# Patient Record
Sex: FEMALE | Race: WHITE | Employment: OTHER | URBAN - METROPOLITAN AREA
[De-identification: names, ages, dates, MRNs, and addresses within clinical notes are randomized per-mention and may not be internally consistent; named-entity substitution may affect disease eponyms.]

---

## 2017-06-06 PROCEDURE — 88175 CYTOPATH C/V AUTO FLUID REDO: CPT | Performed by: OBSTETRICS & GYNECOLOGY

## 2018-10-01 ENCOUNTER — TELEPHONE (OUTPATIENT)
Dept: NEUROLOGY | Facility: CLINIC | Age: 53
End: 2018-10-01

## 2018-10-01 NOTE — H&P
[id number] [facility] with [facility]  10/1/2018    9:51 AM      Cc: Headaches    HPI:    Feb 2016, she fell on ice in driveway falling backwards.   She lost consciousness for approximately 20 minutes (accompanyi Take 300 mg by mouth every evening., Disp: , Rfl:   •  Triamterene-HCTZ 37.5-25 MG Oral Tab, Take 1 tablet by mouth daily. , Disp: , Rfl:   •  ondansetron 4 MG Oral Tablet Dispersible, Take 4 mg by mouth every 8 (eight) hours as needed for Nausea., Disp: , •  MAGNESIUM OR, Take 500 mg by mouth every 30 (thirty) days. , Disp: , Rfl:   •  aspirin 81 MG Oral Tab, Take 81 mg by mouth daily. , Disp: , Rfl:   •  TURMERIC OR, Take by mouth., Disp: , Rfl:   •  Multiple Vitamins-Minerals (MULTIVITAMIN OR), Take by - possibly complicated migraine vs seizures    Dizziness - central plus peripheral component        Dx:  EEG, Neuropsychological evaluation    Tx:    Change Gabapentin to Topiramate 50 mg HS  Stop Indomethacin  Relpax with augmentation strategy (600 mg ibu

## 2018-10-02 ENCOUNTER — TELEPHONE (OUTPATIENT)
Dept: NEUROLOGY | Facility: CLINIC | Age: 53
End: 2018-10-02

## 2018-10-02 DIAGNOSIS — G43.701 CHRONIC MIGRAINE WITHOUT AURA WITH STATUS MIGRAINOSUS, NOT INTRACTABLE: Primary | ICD-10-CM

## 2018-10-02 RX ORDER — DIVALPROEX SODIUM 500 MG/1
500 TABLET, EXTENDED RELEASE ORAL DAILY
Qty: 30 TABLET | Refills: 5 | Status: SHIPPED | OUTPATIENT
Start: 2018-10-02 | End: 2019-01-25

## 2018-10-02 NOTE — TELEPHONE ENCOUNTER
Go back on Gabapentin for now  Definitely sounds like allergy to Topiramate    Start Depakote 500 mg ER at HS

## 2018-10-02 NOTE — TELEPHONE ENCOUNTER
pt is having medication reaction with the Topamax and spouse wants to know if Dr can precribe something else

## 2018-10-02 NOTE — TELEPHONE ENCOUNTER
Jazzy Gudino started medications from Dr. Karrie Rice: she took eletriptan yesterday afternoon, and topiramate at 11 PM. At about 3 AM she broke out into a rash and hives, head to toe, feels like she is sunburned.  Also reports nausea and feeling of throat tightness

## 2018-10-02 NOTE — TELEPHONE ENCOUNTER
Patient informed, all questions answered.  Topiramate added to allergy list and removed from medication list.

## 2018-10-05 ENCOUNTER — FAX ONLY (OUTPATIENT)
Dept: NEUROLOGY | Facility: CLINIC | Age: 53
End: 2018-10-05

## 2018-10-05 ENCOUNTER — DOCUMENTATION ONLY (OUTPATIENT)
Dept: NEUROLOGY | Facility: CLINIC | Age: 53
End: 2018-10-05

## 2018-10-05 NOTE — PROGRESS NOTES
Fax Intcial Examination faxed to [facility] with Provider Endorsement to [phone].  Confirmation receipt

## 2018-10-10 ENCOUNTER — NURSE ONLY (OUTPATIENT)
Dept: ELECTROPHYSIOLOGY | Facility: HOSPITAL | Age: 53
End: 2018-10-10
Attending: Other
Payer: COMMERCIAL

## 2018-10-10 DIAGNOSIS — R41.89 COGNITIVE CHANGES: ICD-10-CM

## 2018-10-10 DIAGNOSIS — R42 DIZZINESS AND GIDDINESS: ICD-10-CM

## 2018-10-10 DIAGNOSIS — R29.90 EPISODE OF TRANSIENT NEUROLOGIC SYMPTOMS: ICD-10-CM

## 2018-10-10 DIAGNOSIS — M79.18 CERVICAL MYOFASCIAL PAIN SYNDROME: ICD-10-CM

## 2018-10-10 DIAGNOSIS — G43.701 CHRONIC MIGRAINE WITHOUT AURA WITH STATUS MIGRAINOSUS, NOT INTRACTABLE: ICD-10-CM

## 2018-10-10 DIAGNOSIS — F07.81 POST CONCUSSION SYNDROME: ICD-10-CM

## 2018-10-10 NOTE — PROCEDURES
[address] [facility] in [place]  in affiliation with [facility]  [address]  [address]  [phone]  Fax [phone]    Name: Arya Hart  12/31/1965  Date of Study: Disp:  Rfl:    Naltrexone-Bupropion HCl ER (CONTRAVE) 8-90 MG Oral Tablet 12 Hr Take by mouth. Disp:  Rfl:    Omega-3 1400 MG Oral Cap Take by mouth.  Disp:  Rfl:    NON FORMULARY RELIZEN Disp:  Rfl:    carboxymethylcellulose sod PF (REFRESH CELLUVISC) 1 % Activation procedures:  Not done    Abnormal activity:  There were no focal abnormalities seen, and there were no epileptiform discharges. There were no abnormal paroxysmal discharges. Impression:  Normal awake and sleep EEG.  If there remains a

## 2018-10-12 PROCEDURE — 87624 HPV HI-RISK TYP POOLED RSLT: CPT | Performed by: OBSTETRICS & GYNECOLOGY

## 2018-10-12 PROCEDURE — 88175 CYTOPATH C/V AUTO FLUID REDO: CPT | Performed by: OBSTETRICS & GYNECOLOGY

## 2018-10-15 NOTE — TELEPHONE ENCOUNTER
Medication: Gabapentin 600 mg    Date of last refill:Historical  [place]     Last office visit: 10/1/2018  Due back to clinic per last office note:  RTN in 4 months  Date next office visit scheduled:    Future Appointmen

## 2018-10-17 ENCOUNTER — TELEPHONE (OUTPATIENT)
Dept: NEUROLOGY | Facility: CLINIC | Age: 53
End: 2018-10-17

## 2018-10-17 RX ORDER — GABAPENTIN 600 MG/1
600 TABLET ORAL 2 TIMES DAILY
Qty: 60 TABLET | Refills: 5 | Status: SHIPPED | OUTPATIENT
Start: 2018-10-17 | End: 2019-04-04

## 2018-10-17 NOTE — TELEPHONE ENCOUNTER
Physical therapy initial evaluation received for provider signature and review. Patient will be receiving therapy for headache, cervicalgia related to unspecified injury of the head.     Patient will be seen 3 times per week for 6 weeks as outlined in pl

## 2018-10-18 ENCOUNTER — TELEPHONE (OUTPATIENT)
Dept: NEUROLOGY | Facility: CLINIC | Age: 53
End: 2018-10-18

## 2018-10-18 RX ORDER — ELETRIPTAN HYDROBROMIDE 40 MG/1
40 TABLET, FILM COATED ORAL AS NEEDED
Qty: 8 TABLET | Refills: 5 | Status: SHIPPED | OUTPATIENT
Start: 2018-10-18 | End: 2018-11-17

## 2018-10-18 RX ORDER — METHYLPREDNISOLONE 4 MG/1
TABLET ORAL
Qty: 1 PACKAGE | Refills: 0 | Status: SHIPPED | OUTPATIENT
Start: 2018-10-18 | End: 2018-11-28

## 2018-10-18 NOTE — TELEPHONE ENCOUNTER
Spoke with Luiza Hagen and we will call in Medrol Dosepak and told her to hang in there with the Depakote as its been barely a week that she is taking it.   I will for now renew Eletriptan and expect that within 1-2 weeks we should start seeing diminishing freque

## 2018-10-18 NOTE — TELEPHONE ENCOUNTER
Patient was requesting a refill on Eletriptan due to getting a lot of headaches. Patient used all of her tabs from an rx on 10/08/18 and still is having headaches.  Patient is also on divalproex as a preventative and wants to know what can she get for the h

## 2018-10-22 ENCOUNTER — TELEPHONE (OUTPATIENT)
Dept: NEUROLOGY | Facility: CLINIC | Age: 53
End: 2018-10-22

## 2018-11-08 ENCOUNTER — TELEPHONE (OUTPATIENT)
Dept: NEUROLOGY | Facility: CLINIC | Age: 53
End: 2018-11-08

## 2018-11-08 NOTE — TELEPHONE ENCOUNTER
Received PT progress notes for MD review and signature. Patient to continue PT 3 times a week for 6 weeks. Paperwork signed and returned via fax.

## 2018-11-28 ENCOUNTER — OFFICE VISIT (OUTPATIENT)
Dept: SURGERY | Facility: CLINIC | Age: 53
End: 2018-11-28
Payer: COMMERCIAL

## 2018-11-28 VITALS — BODY MASS INDEX: 27.64 KG/M2 | HEIGHT: 66.5 IN | WEIGHT: 174 LBS

## 2018-11-28 DIAGNOSIS — G57.82 COMPRESSION NEUROPATHY OF LEFT ILIOINGUINAL NERVE: ICD-10-CM

## 2018-11-28 DIAGNOSIS — G57.81: ICD-10-CM

## 2018-11-28 DIAGNOSIS — E65 ABDOMINAL PANNUS: Primary | ICD-10-CM

## 2018-11-28 PROCEDURE — 99243 OFF/OP CNSLTJ NEW/EST LOW 30: CPT | Performed by: SURGERY

## 2018-11-29 NOTE — CONSULTS
New Patient Consultation    Chief Complaint:  Patient presents with:  Consult: Kerrie Packer.  Dr. Ana Leonard Referring      History of Present Illness:   Lois Bautista is a 46year old female referred by Dr. Ana Leonard for evaluation of pannus and ilioingu Disp:  Rfl:    Probiotic Product (PROBIOTIC ACIDOPHILUS BEADS OR) Take by mouth. Disp:  Rfl:    RaNITidine HCl 300 MG Oral Cap Take 300 mg by mouth every evening. Disp:  Rfl:    Triamterene-HCTZ 37.5-25 MG Oral Tab Take 1 tablet by mouth daily.  Disp:  Rfl: mouth. Disp:  Rfl:    Pantoprazole Sodium 40 MG Oral Tab EC Take 40 mg by mouth every morning before breakfast. Disp:  Rfl:      No current facility-administered medications on file prior to visit.        Allergies:      Topiramate              ANAPHYLAXIS Systems:    A 13 point review of systems was performed on the intake sheet.   The patient reports see HPI, patient reports weight loss, double vision, asthma, reflux symptoms, constipation, frequent urination and nighttime urine, skin lesions easy bruising itching, skin lesions, dry skin, change in skin color or change in moles, sunburns, or sunburns with blistering.    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 Discussion and Plan:  The patient was counseled on the different treatment options.      Discussed with the patient the option of local anes

## 2018-11-30 NOTE — PROGRESS NOTES
The following message was sent to our billing department on behalf of Dr. Lily Bhatti:     \"Hi,     Dr. Lily Bhatti would like you to determine insurance approval and provide a quote as appropriate for:     Abdominoplasty, 3.5 hours, general anesthesia      Excision o

## 2018-12-05 ENCOUNTER — TELEPHONE (OUTPATIENT)
Dept: SURGERY | Facility: CLINIC | Age: 53
End: 2018-12-05

## 2018-12-07 ENCOUNTER — TELEPHONE (OUTPATIENT)
Dept: NEUROLOGY | Facility: CLINIC | Age: 53
End: 2018-12-07

## 2018-12-07 NOTE — TELEPHONE ENCOUNTER
Spoke with patient  Prophylactic treatment is limited  Aimovig Rx written.     Lucio Sims MD  Vascular & General Neurology  Director, Multiple Sclerosis Program  [facility]  12/7/2018, Time completed 11:25 AM

## 2018-12-07 NOTE — TELEPHONE ENCOUNTER
Pt reports that she has been experiencing side effects from the Depakote ER:  Continuous muscle pain in upper arms and thighs (not workout related), muscle twitching in arms and legs, hair breaking, constant hoarseness, excessive sleepiness during the day

## 2018-12-11 ENCOUNTER — TELEPHONE (OUTPATIENT)
Dept: NEUROLOGY | Facility: CLINIC | Age: 53
End: 2018-12-11

## 2018-12-24 NOTE — TELEPHONE ENCOUNTER
Pharmacy having trouble identifying patient. CMM rep will fax a form we can use that may help with this error.

## 2018-12-27 NOTE — TELEPHONE ENCOUNTER
Received denial from [employer] noting that patient needs to try/fail at least 2 different migraine prevention classes includin) antidepresant  2) anticonvulsant  3) beta blockers    Initial request noted that patient was unable to try/fail additional anti

## 2019-01-04 NOTE — TELEPHONE ENCOUNTER
L/M requesting a call back regarding her pre- d for surgery. Pre-D has been denied as not medically necessary. Patient should have received a denial letter from [facility].

## 2019-01-04 NOTE — TELEPHONE ENCOUNTER
S/W patient and informed her of the pre-d denial for codes [id number] and  per [facility] not medically necessary. Patient states that because of her ilioinguinal that this should be approved. Patient would like Dr. Ruddy Pollock to appeal this denial on her behave.   I

## 2019-01-07 ENCOUNTER — TELEPHONE (OUTPATIENT)
Dept: SURGERY | Facility: CLINIC | Age: 54
End: 2019-01-07

## 2019-01-07 NOTE — TELEPHONE ENCOUNTER
Received an email from Dr. Bk Swann regarding the peer to peer and or appeal request for the patient. Dr. Bk Swann stated that at this time patient does not meet all of BCBS criteria at this time.   Dr. Bk Swann recommends the patient work with her PCP to meet and

## 2019-01-07 NOTE — TELEPHONE ENCOUNTER
I called Deborah Memos on behalf of Dr. Ibarra Quiet and encouraged her to contact her PCP to have them document her tried and failed therapies regarding her desired surgical procedure-  She verbalized understanding and will contact her PCP to have them document the need

## 2019-01-09 NOTE — TELEPHONE ENCOUNTER
Pt stopped at office. Denied with medication and want to try 2 new medications and did not work well and had side effects. Divalproex sodium - reaction  Toprimade - reaction    Relayed below information and gave pt copy of appeal letter.

## 2019-01-10 NOTE — TELEPHONE ENCOUNTER
Fax received from [facility] requesting vestibular rehab and balance training to be added to order. Signed order faxed with receipt confirmation.

## 2019-01-11 ENCOUNTER — TELEPHONE (OUTPATIENT)
Dept: NEUROLOGY | Facility: CLINIC | Age: 54
End: 2019-01-11

## 2019-01-11 NOTE — TELEPHONE ENCOUNTER
PT progress notes received for MD review and signature. Patient to continue PT 2-3 times per week x 6 weeks.  With goals to improve (pain relief, decrease inflammation, increase blood flow, improve tissue healing), electrical stimulation (interferential,

## 2019-01-16 NOTE — TELEPHONE ENCOUNTER
Arjun Martinez notifying us the Aimovig appeal was denied. Office notes did not support other medications were tried and failed.

## 2019-01-25 NOTE — PROGRESS NOTES
[facility] with [address]  12/31/1965  Primary Care Provider:  Kathleen Robbins    1/25/2019  Accompanied visit:  (x) No ( ) yes, by:      48year old yo patient being seen f Oral Tab, Take 1 tablet by mouth daily. , Disp: , Rfl:   •  ondansetron 4 MG Oral Tablet Dispersible, Take 4 mg by mouth every 8 (eight) hours as needed for Nausea., Disp: , Rfl:   •  albuterol sulfate (2.5 MG/3ML) 0.083% Inhalation Nebu Soln, Take by nebul ANAPHYLAXIS  Propoxyphene N-Apap     NAUSEA AND VOMITING  Sodium Pentobarbita*    UNKNOWN  Sulfa Antibiotics       UNKNOWN  Valium [Diazepam]       UNKNOWN  Vicodin [Hydrocodon*    UNKNOWN  Byetta                  RASH  Influenza Vaccines      R 40 MG Oral Tab          Sig: Take 1 tablet (40 mg total) by mouth as needed. Dispense:  9 tablet          Refill:  5      Eletriptan Hydrobromide 40 MG Oral Tab          Sig: Take 1 tablet (40 mg total) by mouth as needed.           Dispense:  9 ta

## 2019-01-25 NOTE — PATIENT INSTRUCTIONS
Refill policies:    • Allow 2-3 business days for refills; controlled substances may take longer.   • Contact your pharmacy at least 5 days prior to running out of medication and have them send an electronic request or submit request through the “request re been approved by your insurer. Depending on your insurance carrier, approval may take 3-10 days. It is highly recommended patients contact their insurance carrier directly to determine coverage.   If test is done without insurance authorization, patient ma

## 2019-02-14 ENCOUNTER — TELEPHONE (OUTPATIENT)
Dept: NEUROLOGY | Facility: CLINIC | Age: 54
End: 2019-02-14

## 2019-02-14 NOTE — TELEPHONE ENCOUNTER
PT progress notes received from [facility] for physician review and signature. Patient receives PT for headache, cervicalgia, dissiness    Frequency: 2-3 times per week    Duration: 6 weeks    Plan of Care:  Therapeutic exercises, gait training moda

## 2019-03-04 ENCOUNTER — TELEPHONE (OUTPATIENT)
Dept: NEUROLOGY | Facility: CLINIC | Age: 54
End: 2019-03-04

## 2019-03-13 NOTE — TELEPHONE ENCOUNTER
Received authorization for Zomig nasal spray effective 3/1/19 - 3/1/20. Case ID [id number]. Notification forwarded to pharmacy.

## 2019-03-25 ENCOUNTER — TELEPHONE (OUTPATIENT)
Dept: NEUROLOGY | Facility: CLINIC | Age: 54
End: 2019-03-25

## 2019-04-04 RX ORDER — GABAPENTIN 600 MG/1
TABLET ORAL
Qty: 60 TABLET | Refills: 5 | Status: SHIPPED | OUTPATIENT
Start: 2019-04-04 | End: 2019-10-03

## 2019-04-04 NOTE — TELEPHONE ENCOUNTER
Medication: Gabapentin 600 mg    Date of last refill: 10/17/18 with 5 addt refills  [place]     Last office visit: 01/25/19  Due back to clinic per last office note:  RTN in 6 months  Date next office visit scheduled: ( ) Followup for special test  /or keep scheduled appointment  Patient understands that if needed, based on condition and or test results, follow up will be readjusted           Chris Vo MD  Vascular & General Neurology  Director, Multiple Sclerosis

## 2019-04-29 ENCOUNTER — TELEPHONE (OUTPATIENT)
Dept: NEUROLOGY | Facility: CLINIC | Age: 54
End: 2019-04-29

## 2019-04-29 ENCOUNTER — OFFICE VISIT (OUTPATIENT)
Dept: NEUROLOGY | Facility: CLINIC | Age: 54
End: 2019-04-29
Payer: COMMERCIAL

## 2019-04-29 VITALS
SYSTOLIC BLOOD PRESSURE: 118 MMHG | RESPIRATION RATE: 16 BRPM | DIASTOLIC BLOOD PRESSURE: 80 MMHG | HEIGHT: 66.5 IN | TEMPERATURE: 98 F | BODY MASS INDEX: 28.35 KG/M2 | WEIGHT: 178.5 LBS | HEART RATE: 74 BPM

## 2019-04-29 DIAGNOSIS — G43.701 CHRONIC MIGRAINE WITHOUT AURA WITH STATUS MIGRAINOSUS, NOT INTRACTABLE: Primary | ICD-10-CM

## 2019-04-29 DIAGNOSIS — M79.18 CERVICAL MYOFASCIAL PAIN SYNDROME: ICD-10-CM

## 2019-04-29 DIAGNOSIS — M79.18 CERVICAL MYOFASCIAL PAIN SYNDROME: Primary | ICD-10-CM

## 2019-04-29 DIAGNOSIS — R29.90 EPISODE OF TRANSIENT NEUROLOGIC SYMPTOMS: ICD-10-CM

## 2019-04-29 PROCEDURE — 99213 OFFICE O/P EST LOW 20 MIN: CPT | Performed by: OTHER

## 2019-04-29 PROCEDURE — 20553 NJX 1/MLT TRIGGER POINTS 3/>: CPT | Performed by: OTHER

## 2019-04-29 RX ORDER — BUPIVACAINE HYDROCHLORIDE 2.5 MG/ML
4 INJECTION, SOLUTION EPIDURAL; INFILTRATION; INTRACAUDAL ONCE
Status: COMPLETED | OUTPATIENT
Start: 2019-04-29 | End: 2019-04-29

## 2019-04-29 RX ORDER — ZOLMITRIPTAN 5 MG/1
SPRAY NASAL
Qty: 5 EACH | Refills: 3 | Status: SHIPPED | OUTPATIENT
Start: 2019-04-29 | End: 2019-06-05

## 2019-04-29 RX ORDER — ELETRIPTAN HYDROBROMIDE 40 MG/1
40 TABLET, FILM COATED ORAL AS NEEDED
Qty: 9 TABLET | Refills: 5 | Status: SHIPPED | OUTPATIENT
Start: 2019-04-29 | End: 2020-01-23

## 2019-04-29 RX ORDER — TRIAMCINOLONE ACETONIDE 40 MG/ML
40 INJECTION, SUSPENSION INTRA-ARTICULAR; INTRAMUSCULAR ONCE
Status: COMPLETED | OUTPATIENT
Start: 2019-04-29 | End: 2019-04-29

## 2019-04-29 NOTE — TELEPHONE ENCOUNTER
Called [facility] and spoke with Shantel Gibbs codes 62247,78797 are valid and billable no authorization or predetermination required. Call reference #[id number].  Time on call 20:14

## 2019-04-29 NOTE — TELEPHONE ENCOUNTER
Plan of PT: 2 - 3 times a week for 6 weeks. Treatment to be provided therapeutic exercise, gait training, neuromuscular rehabilitation, manual therapy and patient education.      Patient problems: neck pain, poor balance, impaired gait, decreased tolerance

## 2019-04-29 NOTE — TELEPHONE ENCOUNTER
Trigger point injection was administered at office visit. Will request PA for procedure given today.

## 2019-04-29 NOTE — TELEPHONE ENCOUNTER
Dr. Zainab Cortez reviewed 4/26/19 P. T progress note and signed. Faxed back to [email or web address], fax receipt confirmed. Report placed in RN bin for documentation.

## 2019-04-29 NOTE — PROGRESS NOTES
[facility] with [address]  12/31/1965  Primary Care Provider:  Yael Heredia    4/29/2019  Accompanied visit:  (x) No ( ) yes, by:         48year old yo patient being see RaNITidine HCl 300 MG Oral Cap, Take 300 mg by mouth every evening., Disp: , Rfl:   •  Triamterene-HCTZ 37.5-25 MG Oral Tab, Take 1 tablet by mouth daily. , Disp: , Rfl:   •  ondansetron 4 MG Oral Tablet Dispersible, Take 4 mg by mouth every 8 (eight) hours Oral Tab EC, Take 40 mg by mouth every morning before breakfast., Disp: , Rfl:   PRN:     Allergies:    Topiramate              ANAPHYLAXIS  Valproic Acid           OTHER (SEE COMMENTS)    Comment:Tremors, horse voice, weight gain, hair loss  Propoxyphene on the case:  Continue PTX  Request BOTOX and or AIMOVIG/ AJOVY    Diagnostics & Orders:  Orders Placed This Encounter      Eletriptan Hydrobromide 40 MG Oral Tab          Sig: Take 1 tablet (40 mg total) by mouth as needed.           Dispense:  9 tablet

## 2019-05-14 ENCOUNTER — TELEPHONE (OUTPATIENT)
Dept: NEUROLOGY | Facility: CLINIC | Age: 54
End: 2019-05-14

## 2019-05-28 ENCOUNTER — OFFICE VISIT (OUTPATIENT)
Dept: NEUROLOGY | Facility: CLINIC | Age: 54
End: 2019-05-28
Payer: COMMERCIAL

## 2019-05-28 VITALS
SYSTOLIC BLOOD PRESSURE: 126 MMHG | WEIGHT: 178 LBS | BODY MASS INDEX: 28.27 KG/M2 | HEIGHT: 66.5 IN | DIASTOLIC BLOOD PRESSURE: 77 MMHG | HEART RATE: 79 BPM | RESPIRATION RATE: 16 BRPM

## 2019-05-28 DIAGNOSIS — G43.701 CHRONIC MIGRAINE WITHOUT AURA WITH STATUS MIGRAINOSUS, NOT INTRACTABLE: Primary | ICD-10-CM

## 2019-05-28 DIAGNOSIS — M79.18 CERVICAL MYOFASCIAL PAIN SYNDROME: ICD-10-CM

## 2019-05-28 PROCEDURE — 99213 OFFICE O/P EST LOW 20 MIN: CPT | Performed by: OTHER

## 2019-05-28 PROCEDURE — 20553 NJX 1/MLT TRIGGER POINTS 3/>: CPT | Performed by: OTHER

## 2019-05-28 RX ORDER — TRIAMCINOLONE ACETONIDE 40 MG/ML
40 INJECTION, SUSPENSION INTRA-ARTICULAR; INTRAMUSCULAR ONCE
Status: COMPLETED | OUTPATIENT
Start: 2019-05-28 | End: 2019-05-28

## 2019-05-28 RX ORDER — BUPIVACAINE HYDROCHLORIDE 2.5 MG/ML
8 INJECTION, SOLUTION EPIDURAL; INFILTRATION; INTRACAUDAL ONCE
Status: COMPLETED | OUTPATIENT
Start: 2019-05-28 | End: 2019-05-28

## 2019-05-28 NOTE — PROGRESS NOTES
[facility] with [address]  12/31/1965  Primary Care Provider:  Joseph Kauffman    5/28/2019  Accompanied visit:     () No.        48year old yo patient being seen fo Disp: , Rfl:   •  Albuterol Sulfate  (90 Base) MCG/ACT Inhalation Aero Soln, Inhale into the lungs every 6 (six) hours as needed for Wheezing., Disp: , Rfl:   •  cetirizine 10 MG Oral Tab, Take 10 mg by mouth daily. , Disp: , Rfl:   •  Glucose Blood UNKNOWN  Byetta                  RASH  Influenza Vaccines      RASH  Metformin               DIARRHEA         EXAM:  /77 (BP Location: Left arm, Patient Position: Sitting, Cuff Size: adult)   Pulse 79   Resp 16   Ht 66.5\"   Wt 178 lb   BMI 28.30 kg/ MA/PSRs during the entirety of the visit

## 2019-05-28 NOTE — PROCEDURES
Silvina  5/28/2019      Procedure:  Trigger Point Injections  NOTES:   MTP in the left suboccipital, upper trapezius and interscapular,  Less tender right suboccipital, upper trapezius and rhomboids    /77 (BP Location: Left ar

## 2019-05-31 ENCOUNTER — PATIENT MESSAGE (OUTPATIENT)
Dept: NEUROLOGY | Facility: CLINIC | Age: 54
End: 2019-05-31

## 2019-05-31 NOTE — TELEPHONE ENCOUNTER
From: Lois Bautista  To: Radha Montenegro MD  Sent: 5/31/2019 12:25 PM CDT  Subject: Prescription Question    Dr Karrie Rice had ordered me Zomig 5mg for migraines.  My insurance has approved 12 a month but  wrote it for 6 so they wont fill it till its a

## 2019-06-03 ENCOUNTER — TELEPHONE (OUTPATIENT)
Dept: NEUROLOGY | Facility: CLINIC | Age: 54
End: 2019-06-03

## 2019-06-03 NOTE — TELEPHONE ENCOUNTER
Received PT progress note for MD review and signature. Patient continues to receive PT 2-3 times a week for 6 weeks to include therapeutic exercises, gait training, neuromuscular rehabilitation, manual therapy and HEP / postural training education.     Andre Vilalsenor

## 2019-06-04 NOTE — TELEPHONE ENCOUNTER
Denial notes reviewed. Denial of appeal states: Your doctor's notes do not show that you have tried and failed the medicines (antidepressants such as venlafaxine, nortriptyline, and amitriptyline) covered by your plan.  Therefore, this medicine (Aimovig) Manual Repair Warning Statement: We plan on removing the manually selected variable below in favor of our much easier automatic structured text blocks found in the previous tab. We decided to do this to help make the flow better and give you the full power of structured data. Manual selection is never going to be ideal in our platform and I would encourage you to avoid using manual selection from this point on, especially since I will be sunsetting this feature. It is important that you do one of two things with the customized text below. First, you can save all of the text in a word file so you can have it for future reference. Second, transfer the text to the appropriate area in the Library tab. Lastly, if there is a flap or graft type which we do not have you need to let us know right away so I can add it in before the variable is hidden. No need to panic, we plan to give you roughly 6 months to make the change.

## 2019-06-05 ENCOUNTER — OFFICE VISIT (OUTPATIENT)
Dept: SURGERY | Facility: CLINIC | Age: 54
End: 2019-06-05
Payer: COMMERCIAL

## 2019-06-05 VITALS — HEIGHT: 66.5 IN | BODY MASS INDEX: 28.53 KG/M2 | WEIGHT: 179.63 LBS

## 2019-06-05 DIAGNOSIS — S31.109A OPEN WOUND OF ABDOMEN, INITIAL ENCOUNTER: ICD-10-CM

## 2019-06-05 DIAGNOSIS — E65 ABDOMINAL PANNUS: ICD-10-CM

## 2019-06-05 DIAGNOSIS — G57.82 COMPRESSION NEUROPATHY OF LEFT ILIOINGUINAL NERVE: ICD-10-CM

## 2019-06-05 DIAGNOSIS — L30.4 INTERTRIGO: ICD-10-CM

## 2019-06-05 DIAGNOSIS — G57.81: Primary | ICD-10-CM

## 2019-06-05 PROCEDURE — 99213 OFFICE O/P EST LOW 20 MIN: CPT | Performed by: SURGERY

## 2019-06-05 RX ORDER — ZOLMITRIPTAN 5 MG/1
SPRAY NASAL
Qty: 12 EACH | Refills: 3 | Status: SHIPPED | OUTPATIENT
Start: 2019-06-05 | End: 2019-12-04

## 2019-06-05 NOTE — CONSULTS
Estabilshed Patient Consultation    Chief Complaint: open wound abdomen, intertrigo, abdominal pannus, nerve entrapment ilioinguinal iliohypogastric    History of Present Illness:   Sneha Atwood is a 46year old female referred by Dr. Radha Beltran for eval Laterality Date   •      • CHOLECYSTECTOMY     • D & C     • ENDOMETRIAL BIOPSY - JAR(S): 2     • HYSTERECTOMY  2006    MARIVEL BSO   • LAPAROSCOPY PROCEDURE UNLISTED     • LASIK     • OTHER SURGICAL HISTORY      rhinoplasty   • NATHALY NON FORMULARY RELIZEN Disp:  Rfl:    carboxymethylcellulose sod PF (REFRESH CELLUVISC) 1 % Ophthalmic Gel 1 drop as needed. Disp:  Rfl:    Rosuvastatin Calcium 20 MG Oral Tab Take 20 mg by mouth nightly.  Disp:  Rfl:    NON FORMULARY Tart Cherry Extract D Sister    • Other (anticardiolipin antibodies) Sister          Social History:    Alcohol use Yes 1.2 oz/week 2 Standard drinks or equivalent per week         Smoking status: Former Smoker         Drug use: No           Review of Systems:    The patient re will obtain insurance approval for the abdominoplasty as well as the entrapped nerve release on both sides.   We will then see her back for preoperative visit I will see her to discuss further details about the surgery, pt is holding end of October for surg

## 2019-06-06 ENCOUNTER — NURSE ONLY (OUTPATIENT)
Dept: SURGERY | Facility: CLINIC | Age: 54
End: 2019-06-06

## 2019-06-06 NOTE — PROGRESS NOTES
Submitted to  by ZULL[employer] for insurance approval:    Ilioinguinal  iliohypogastric nerve entrapment release/decompression & Abdominoplasty, 5 hours, general anesthesia.      Once patient receives approval, we will get her in for a pre

## 2019-07-01 ENCOUNTER — OFFICE VISIT (OUTPATIENT)
Dept: NEUROLOGY | Facility: CLINIC | Age: 54
End: 2019-07-01
Payer: COMMERCIAL

## 2019-07-01 VITALS
SYSTOLIC BLOOD PRESSURE: 118 MMHG | RESPIRATION RATE: 16 BRPM | DIASTOLIC BLOOD PRESSURE: 70 MMHG | BODY MASS INDEX: 28.77 KG/M2 | WEIGHT: 179 LBS | HEART RATE: 84 BPM | HEIGHT: 66 IN

## 2019-07-01 DIAGNOSIS — M79.18 CERVICAL MYOFASCIAL PAIN SYNDROME: Primary | ICD-10-CM

## 2019-07-01 DIAGNOSIS — G43.701 CHRONIC MIGRAINE WITHOUT AURA WITH STATUS MIGRAINOSUS, NOT INTRACTABLE: ICD-10-CM

## 2019-07-01 PROCEDURE — 20553 NJX 1/MLT TRIGGER POINTS 3/>: CPT | Performed by: OTHER

## 2019-07-01 RX ORDER — BUPIVACAINE HYDROCHLORIDE 2.5 MG/ML
8 INJECTION, SOLUTION EPIDURAL; INFILTRATION; INTRACAUDAL ONCE
Status: COMPLETED | OUTPATIENT
Start: 2019-07-01 | End: 2019-07-01

## 2019-07-01 RX ORDER — TRIAMCINOLONE ACETONIDE 40 MG/ML
40 INJECTION, SUSPENSION INTRA-ARTICULAR; INTRAMUSCULAR ONCE
Status: COMPLETED | OUTPATIENT
Start: 2019-07-01 | End: 2019-07-01

## 2019-07-01 RX ADMIN — TRIAMCINOLONE ACETONIDE 40 MG: 40 INJECTION, SUSPENSION INTRA-ARTICULAR; INTRAMUSCULAR at 12:15:00

## 2019-07-01 RX ADMIN — BUPIVACAINE HYDROCHLORIDE 8 ML: 2.5 INJECTION, SOLUTION EPIDURAL; INFILTRATION; INTRACAUDAL at 12:14:00

## 2019-07-01 NOTE — PROGRESS NOTES
Silvina  7/1/2019    Procedure:  Trigger Point Injections  NOTES:   Her HA were better controlled after the TPI and headaches seems more responsive to Zomig.       /70 (BP Location: Left arm, Patient Position: Sitting, Cuff Size

## 2019-07-12 ENCOUNTER — TELEPHONE (OUTPATIENT)
Dept: NEUROLOGY | Facility: CLINIC | Age: 54
End: 2019-07-12

## 2019-07-12 NOTE — TELEPHONE ENCOUNTER
Received PT progress notes for DOS 7/12/19. Patient receives PT 2-3 times per week for 6 weeks to work on headaches, cervicalgia, peripheral neuropathy, dizziness. Paperwork signed by Dr. Vikki Barrios and returned via fax.

## 2019-08-22 ENCOUNTER — TELEPHONE (OUTPATIENT)
Dept: NEUROLOGY | Facility: CLINIC | Age: 54
End: 2019-08-22

## 2019-08-30 ENCOUNTER — TELEPHONE (OUTPATIENT)
Dept: NEUROLOGY | Facility: CLINIC | Age: 54
End: 2019-08-30

## 2019-08-30 NOTE — TELEPHONE ENCOUNTER
Script authorizing continued PT for 1-2 times per week for 4 weeks, for diagnosis of headache, neck pain and PN signed and faxed to [facility], confirmation rec'd.

## 2019-09-03 ENCOUNTER — TELEPHONE (OUTPATIENT)
Dept: SURGERY | Facility: CLINIC | Age: 54
End: 2019-09-03

## 2019-09-03 NOTE — TELEPHONE ENCOUNTER
I spoke with the patient to confirm her appointment with Dr. Rema Dorman tomorrow-  She states \"I have definitely received approval for the surgery and am having this surgery no matter what\"-  All appointment details were confirmed and according to the notes i

## 2019-09-04 ENCOUNTER — OFFICE VISIT (OUTPATIENT)
Dept: SURGERY | Facility: CLINIC | Age: 54
End: 2019-09-04
Payer: COMMERCIAL

## 2019-09-04 DIAGNOSIS — E65 ABDOMINAL PANNUS: ICD-10-CM

## 2019-09-04 DIAGNOSIS — Z01.818 PRE-OP TESTING: Primary | ICD-10-CM

## 2019-09-04 PROCEDURE — 99213 OFFICE O/P EST LOW 20 MIN: CPT | Performed by: SURGERY

## 2019-09-04 NOTE — PROGRESS NOTES
Surgery and wash instructions verbally reviewed with the patient and written instructions were also provided. The patient understands the need to obtain medical clearance for this procedure and plans to see Dr. Anastasia Avalos for this.      Informed consent for t

## 2019-09-04 NOTE — CONSULTS
Estabilshed Patient Consultation    Chief Complaint:open wound abdomen, intertrigo, abdominal pannus, nerve entrapment ilioinguinal iliohypogastric    History of Present Illness:   Emily Fernández is a 46year old female referred by Dr. Bowman Rear History:  Past Surgical History:   Procedure Laterality Date   •      • CHOLECYSTECTOMY     • D & C     • ENDOMETRIAL BIOPSY - JAR(S): 2     • HYSTERECTOMY      MARIVEL BSO   • LAPAROSCOPY PROCEDURE UNLISTED     • LASIK     • O carboxymethylcellulose sod PF (REFRESH CELLUVISC) 1 % Ophthalmic Gel 1 drop as needed. Disp:  Rfl:    Rosuvastatin Calcium 20 MG Oral Tab Take 20 mg by mouth nightly.  Disp:  Rfl:    NON FORMULARY Tart Cherry Extract Disp:  Rfl:    Dulaglutide (Anneliese Pound) antibodies) Sister          Social History:    Alcohol use Yes 2.0 standard drinks/week 2 Standard drinks or equivalent per week         Smoking status: Former Smoker         Drug use: No           Review of Systems:    The patient reports see HPI  All oth treatment options were discussed with the patient.  The procedures and the postoperative care was discussed in detail.  Potential risks complications benefits and alternatives were discussed.  Risks and complications including but not limited to infection,

## 2019-09-04 NOTE — PATIENT INSTRUCTIONS
Surgeon: Dr. Shirley Vera, PhD     Tel:  [phone]    Fax: [phone]     Surgery/Procedure: Ilioinguinal nerve release and abdominoplasty, 5 hours, general anesthesia, observation stay        Hospital:  [facility]: [address] [place] [address]

## 2019-09-24 ENCOUNTER — APPOINTMENT (OUTPATIENT)
Dept: LAB | Age: 54
End: 2019-09-24
Attending: SURGERY
Payer: COMMERCIAL

## 2019-09-24 DIAGNOSIS — Z01.818 PRE-OP TESTING: ICD-10-CM

## 2019-09-24 LAB
ALBUMIN SERPL-MCNC: 4.3 G/DL (ref 3.4–5)
PREALB SERPL-MCNC: 40.2 MG/DL (ref 20–40)
TRANSFERRIN SERPL-MCNC: 300 MG/DL (ref 200–360)

## 2019-09-24 PROCEDURE — 84134 ASSAY OF PREALBUMIN: CPT | Performed by: SURGERY

## 2019-09-24 PROCEDURE — 84466 ASSAY OF TRANSFERRIN: CPT | Performed by: SURGERY

## 2019-09-24 PROCEDURE — 82040 ASSAY OF SERUM ALBUMIN: CPT | Performed by: SURGERY

## 2019-09-24 PROCEDURE — 36415 COLL VENOUS BLD VENIPUNCTURE: CPT | Performed by: SURGERY

## 2019-10-03 RX ORDER — GABAPENTIN 600 MG/1
TABLET ORAL
Qty: 60 TABLET | Refills: 0 | Status: SHIPPED | OUTPATIENT
Start: 2019-10-03 | End: 2019-12-04

## 2019-10-15 ENCOUNTER — TELEPHONE (OUTPATIENT)
Dept: SURGERY | Facility: CLINIC | Age: 54
End: 2019-10-15

## 2019-10-15 DIAGNOSIS — E65 ABDOMINAL PANNUS: Primary | ICD-10-CM

## 2019-10-15 NOTE — TELEPHONE ENCOUNTER
Informed patient of location change for surgery due to limited OR time at [address]   Surgery has been scheduled at [place]. Patient in agreement with date and location

## 2019-10-22 ENCOUNTER — TELEPHONE (OUTPATIENT)
Dept: SURGERY | Facility: CLINIC | Age: 54
End: 2019-10-22

## 2019-10-29 RX ORDER — FAMOTIDINE 40 MG/1
40 TABLET, FILM COATED ORAL DAILY
COMMUNITY
End: 2020-01-17

## 2019-10-29 RX ORDER — IBUPROFEN 600 MG/1
600 TABLET ORAL EVERY 6 HOURS PRN
COMMUNITY
End: 2019-11-13

## 2019-10-29 RX ORDER — METOCLOPRAMIDE 10 MG/1
10 TABLET ORAL ONCE
Status: CANCELLED | OUTPATIENT
Start: 2019-10-29 | End: 2019-10-29

## 2019-10-30 ENCOUNTER — TELEPHONE (OUTPATIENT)
Dept: SURGERY | Facility: CLINIC | Age: 54
End: 2019-10-30

## 2019-10-30 ENCOUNTER — ANESTHESIA EVENT (OUTPATIENT)
Dept: SURGERY | Facility: HOSPITAL | Age: 54
End: 2019-10-30
Payer: COMMERCIAL

## 2019-10-30 NOTE — TELEPHONE ENCOUNTER
Returned pt's call reg surgery tomorrow, pt had questions about the number of days she was to stay at the hospital after surgery.  I let pt know that this was approved as an outpatient procedure and that the letter she received is a little confusing because

## 2019-10-31 ENCOUNTER — HOSPITAL ENCOUNTER (OUTPATIENT)
Facility: HOSPITAL | Age: 54
Setting detail: OBSERVATION
Discharge: HOME | End: 2019-11-01
Attending: SURGERY | Admitting: SURGERY
Payer: COMMERCIAL

## 2019-10-31 ENCOUNTER — ANESTHESIA (OUTPATIENT)
Dept: SURGERY | Facility: HOSPITAL | Age: 54
End: 2019-10-31
Payer: COMMERCIAL

## 2019-10-31 DIAGNOSIS — E65 ABDOMINAL PANNUS: ICD-10-CM

## 2019-10-31 PROCEDURE — 0JU837Z SUPPLEMENT OF ABDOMEN SUBCUTANEOUS TISSUE AND FASCIA WITH AUTOLOGOUS TISSUE SUBSTITUTE, PERCUTANEOUS APPROACH: ICD-10-PCS | Performed by: SURGERY

## 2019-10-31 PROCEDURE — 01NM3ZZ RELEASE ABDOMINAL SYMPATHETIC NERVE, PERCUTANEOUS APPROACH: ICD-10-PCS | Performed by: SURGERY

## 2019-10-31 PROCEDURE — 0J080ZZ ALTERATION OF ABDOMEN SUBCUTANEOUS TISSUE AND FASCIA, OPEN APPROACH: ICD-10-PCS | Performed by: SURGERY

## 2019-10-31 DEVICE — IMPLANTABLE DEVICE: Type: IMPLANTABLE DEVICE | Site: INGUINAL | Status: FUNCTIONAL

## 2019-10-31 RX ORDER — MORPHINE SULFATE 4 MG/ML
2 INJECTION, SOLUTION INTRAMUSCULAR; INTRAVENOUS EVERY 10 MIN PRN
Status: DISCONTINUED | OUTPATIENT
Start: 2019-10-31 | End: 2019-10-31 | Stop reason: HOSPADM

## 2019-10-31 RX ORDER — MORPHINE SULFATE 10 MG/ML
6 INJECTION, SOLUTION INTRAMUSCULAR; INTRAVENOUS EVERY 10 MIN PRN
Status: DISCONTINUED | OUTPATIENT
Start: 2019-10-31 | End: 2019-10-31 | Stop reason: HOSPADM

## 2019-10-31 RX ORDER — SODIUM CHLORIDE, SODIUM LACTATE, POTASSIUM CHLORIDE, CALCIUM CHLORIDE 600; 310; 30; 20 MG/100ML; MG/100ML; MG/100ML; MG/100ML
INJECTION, SOLUTION INTRAVENOUS CONTINUOUS
Status: DISCONTINUED | OUTPATIENT
Start: 2019-10-31 | End: 2019-11-01

## 2019-10-31 RX ORDER — CEFAZOLIN SODIUM/WATER 2 G/20 ML
2 SYRINGE (ML) INTRAVENOUS ONCE
Status: COMPLETED | OUTPATIENT
Start: 2019-10-31 | End: 2019-10-31

## 2019-10-31 RX ORDER — CEPHALEXIN 250 MG/1
250 CAPSULE ORAL 4 TIMES DAILY
Qty: 28 CAPSULE | Refills: 0 | Status: SHIPPED | OUTPATIENT
Start: 2019-10-31 | End: 2019-11-07

## 2019-10-31 RX ORDER — GLYCOPYRROLATE 0.2 MG/ML
INJECTION INTRAMUSCULAR; INTRAVENOUS AS NEEDED
Status: DISCONTINUED | OUTPATIENT
Start: 2019-10-31 | End: 2019-10-31 | Stop reason: SURG

## 2019-10-31 RX ORDER — DEXTROSE MONOHYDRATE 25 G/50ML
50 INJECTION, SOLUTION INTRAVENOUS
Status: DISCONTINUED | OUTPATIENT
Start: 2019-10-31 | End: 2019-10-31 | Stop reason: HOSPADM

## 2019-10-31 RX ORDER — FAMOTIDINE 20 MG/1
40 TABLET ORAL NIGHTLY
Status: DISCONTINUED | OUTPATIENT
Start: 2019-10-31 | End: 2019-11-01

## 2019-10-31 RX ORDER — DIPHENHYDRAMINE HYDROCHLORIDE 50 MG/ML
INJECTION INTRAMUSCULAR; INTRAVENOUS AS NEEDED
Status: DISCONTINUED | OUTPATIENT
Start: 2019-10-31 | End: 2019-10-31 | Stop reason: SURG

## 2019-10-31 RX ORDER — HALOPERIDOL 5 MG/ML
0.25 INJECTION INTRAMUSCULAR ONCE AS NEEDED
Status: DISCONTINUED | OUTPATIENT
Start: 2019-10-31 | End: 2019-10-31 | Stop reason: HOSPADM

## 2019-10-31 RX ORDER — HYDROMORPHONE HYDROCHLORIDE 1 MG/ML
0.4 INJECTION, SOLUTION INTRAMUSCULAR; INTRAVENOUS; SUBCUTANEOUS EVERY 5 MIN PRN
Status: DISCONTINUED | OUTPATIENT
Start: 2019-10-31 | End: 2019-10-31 | Stop reason: HOSPADM

## 2019-10-31 RX ORDER — NALOXONE HYDROCHLORIDE 0.4 MG/ML
80 INJECTION, SOLUTION INTRAMUSCULAR; INTRAVENOUS; SUBCUTANEOUS AS NEEDED
Status: DISCONTINUED | OUTPATIENT
Start: 2019-10-31 | End: 2019-10-31 | Stop reason: HOSPADM

## 2019-10-31 RX ORDER — DOCUSATE SODIUM 100 MG/1
100 CAPSULE, LIQUID FILLED ORAL 2 TIMES DAILY
Qty: 60 CAPSULE | Refills: 0 | Status: SHIPPED | OUTPATIENT
Start: 2019-10-31 | End: 2020-11-04 | Stop reason: ALTCHOICE

## 2019-10-31 RX ORDER — METOCLOPRAMIDE HYDROCHLORIDE 5 MG/ML
10 INJECTION INTRAMUSCULAR; INTRAVENOUS EVERY 6 HOURS PRN
Status: DISCONTINUED | OUTPATIENT
Start: 2019-10-31 | End: 2019-11-01

## 2019-10-31 RX ORDER — FAMOTIDINE 20 MG/1
20 TABLET ORAL ONCE
Status: DISCONTINUED | OUTPATIENT
Start: 2019-10-31 | End: 2019-10-31 | Stop reason: HOSPADM

## 2019-10-31 RX ORDER — HYDROMORPHONE HYDROCHLORIDE 1 MG/ML
INJECTION, SOLUTION INTRAMUSCULAR; INTRAVENOUS; SUBCUTANEOUS AS NEEDED
Status: DISCONTINUED | OUTPATIENT
Start: 2019-10-31 | End: 2019-10-31 | Stop reason: SURG

## 2019-10-31 RX ORDER — ONDANSETRON 2 MG/ML
INJECTION INTRAMUSCULAR; INTRAVENOUS AS NEEDED
Status: DISCONTINUED | OUTPATIENT
Start: 2019-10-31 | End: 2019-10-31 | Stop reason: SURG

## 2019-10-31 RX ORDER — NEOSTIGMINE METHYLSULFATE 0.5 MG/ML
INJECTION INTRAVENOUS AS NEEDED
Status: DISCONTINUED | OUTPATIENT
Start: 2019-10-31 | End: 2019-10-31 | Stop reason: SURG

## 2019-10-31 RX ORDER — ENOXAPARIN SODIUM 100 MG/ML
40 INJECTION SUBCUTANEOUS DAILY
Status: COMPLETED | OUTPATIENT
Start: 2019-11-01 | End: 2019-11-01

## 2019-10-31 RX ORDER — HYDROMORPHONE HYDROCHLORIDE 1 MG/ML
0.6 INJECTION, SOLUTION INTRAMUSCULAR; INTRAVENOUS; SUBCUTANEOUS EVERY 5 MIN PRN
Status: DISCONTINUED | OUTPATIENT
Start: 2019-10-31 | End: 2019-10-31 | Stop reason: HOSPADM

## 2019-10-31 RX ORDER — SODIUM CHLORIDE, SODIUM LACTATE, POTASSIUM CHLORIDE, CALCIUM CHLORIDE 600; 310; 30; 20 MG/100ML; MG/100ML; MG/100ML; MG/100ML
INJECTION, SOLUTION INTRAVENOUS CONTINUOUS
Status: DISCONTINUED | OUTPATIENT
Start: 2019-10-31 | End: 2019-10-31 | Stop reason: HOSPADM

## 2019-10-31 RX ORDER — ACETAMINOPHEN 500 MG
1000 TABLET ORAL ONCE
Status: COMPLETED | OUTPATIENT
Start: 2019-10-31 | End: 2019-10-31

## 2019-10-31 RX ORDER — HYDROCODONE BITARTRATE AND ACETAMINOPHEN 5; 325 MG/1; MG/1
1 TABLET ORAL EVERY 4 HOURS PRN
Status: DISCONTINUED | OUTPATIENT
Start: 2019-10-31 | End: 2019-11-01

## 2019-10-31 RX ORDER — LIDOCAINE HYDROCHLORIDE AND EPINEPHRINE 10; 10 MG/ML; UG/ML
INJECTION, SOLUTION INFILTRATION; PERINEURAL AS NEEDED
Status: DISCONTINUED | OUTPATIENT
Start: 2019-10-31 | End: 2019-10-31 | Stop reason: HOSPADM

## 2019-10-31 RX ORDER — EPHEDRINE SULFATE 50 MG/ML
INJECTION, SOLUTION INTRAVENOUS AS NEEDED
Status: DISCONTINUED | OUTPATIENT
Start: 2019-10-31 | End: 2019-10-31 | Stop reason: SURG

## 2019-10-31 RX ORDER — SCOLOPAMINE TRANSDERMAL SYSTEM 1 MG/1
1 PATCH, EXTENDED RELEASE TRANSDERMAL
Status: DISCONTINUED | OUTPATIENT
Start: 2019-10-31 | End: 2019-11-03 | Stop reason: HOSPADM

## 2019-10-31 RX ORDER — HYDROMORPHONE HYDROCHLORIDE 1 MG/ML
0.2 INJECTION, SOLUTION INTRAMUSCULAR; INTRAVENOUS; SUBCUTANEOUS EVERY 2 HOUR PRN
Status: DISCONTINUED | OUTPATIENT
Start: 2019-10-31 | End: 2019-11-01

## 2019-10-31 RX ORDER — ROCURONIUM BROMIDE 10 MG/ML
INJECTION, SOLUTION INTRAVENOUS AS NEEDED
Status: DISCONTINUED | OUTPATIENT
Start: 2019-10-31 | End: 2019-10-31 | Stop reason: SURG

## 2019-10-31 RX ORDER — BUPIVACAINE HYDROCHLORIDE 5 MG/ML
INJECTION, SOLUTION EPIDURAL; INTRACAUDAL AS NEEDED
Status: DISCONTINUED | OUTPATIENT
Start: 2019-10-31 | End: 2019-10-31 | Stop reason: HOSPADM

## 2019-10-31 RX ORDER — LIDOCAINE HYDROCHLORIDE 10 MG/ML
INJECTION, SOLUTION EPIDURAL; INFILTRATION; INTRACAUDAL; PERINEURAL AS NEEDED
Status: DISCONTINUED | OUTPATIENT
Start: 2019-10-31 | End: 2019-10-31 | Stop reason: SURG

## 2019-10-31 RX ORDER — PROCHLORPERAZINE EDISYLATE 5 MG/ML
5 INJECTION INTRAMUSCULAR; INTRAVENOUS ONCE AS NEEDED
Status: COMPLETED | OUTPATIENT
Start: 2019-10-31 | End: 2019-10-31

## 2019-10-31 RX ORDER — METOCLOPRAMIDE 10 MG/1
10 TABLET ORAL 3 TIMES DAILY PRN
Qty: 20 TABLET | Refills: 0 | Status: SHIPPED | OUTPATIENT
Start: 2019-10-31 | End: 2020-01-23

## 2019-10-31 RX ORDER — INSULIN ASPART 100 [IU]/ML
INJECTION, SUSPENSION SUBCUTANEOUS
COMMUNITY
End: 2021-12-13

## 2019-10-31 RX ORDER — DEXAMETHASONE SODIUM PHOSPHATE 4 MG/ML
VIAL (ML) INJECTION AS NEEDED
Status: DISCONTINUED | OUTPATIENT
Start: 2019-10-31 | End: 2019-10-31 | Stop reason: SURG

## 2019-10-31 RX ORDER — ONDANSETRON 2 MG/ML
4 INJECTION INTRAMUSCULAR; INTRAVENOUS ONCE AS NEEDED
Status: COMPLETED | OUTPATIENT
Start: 2019-10-31 | End: 2019-10-31

## 2019-10-31 RX ORDER — HYDROMORPHONE HYDROCHLORIDE 1 MG/ML
0.2 INJECTION, SOLUTION INTRAMUSCULAR; INTRAVENOUS; SUBCUTANEOUS EVERY 5 MIN PRN
Status: DISCONTINUED | OUTPATIENT
Start: 2019-10-31 | End: 2019-10-31 | Stop reason: HOSPADM

## 2019-10-31 RX ORDER — MORPHINE SULFATE 4 MG/ML
4 INJECTION, SOLUTION INTRAMUSCULAR; INTRAVENOUS EVERY 10 MIN PRN
Status: DISCONTINUED | OUTPATIENT
Start: 2019-10-31 | End: 2019-10-31 | Stop reason: HOSPADM

## 2019-10-31 RX ORDER — HYDROCODONE BITARTRATE AND ACETAMINOPHEN 5; 325 MG/1; MG/1
1-2 TABLET ORAL EVERY 4 HOURS PRN
Qty: 40 TABLET | Refills: 0 | Status: SHIPPED | OUTPATIENT
Start: 2019-10-31 | End: 2020-01-23

## 2019-10-31 RX ORDER — ONDANSETRON 4 MG/1
4 TABLET, ORALLY DISINTEGRATING ORAL EVERY 4 HOURS PRN
Qty: 10 TABLET | Refills: 0 | Status: SHIPPED | OUTPATIENT
Start: 2019-10-31 | End: 2020-11-04 | Stop reason: ALTCHOICE

## 2019-10-31 RX ORDER — ONDANSETRON 2 MG/ML
4 INJECTION INTRAMUSCULAR; INTRAVENOUS EVERY 6 HOURS PRN
Status: DISCONTINUED | OUTPATIENT
Start: 2019-10-31 | End: 2019-11-01

## 2019-10-31 RX ADMIN — ROCURONIUM BROMIDE 10 MG: 10 INJECTION, SOLUTION INTRAVENOUS at 11:15:00

## 2019-10-31 RX ADMIN — ROCURONIUM BROMIDE 10 MG: 10 INJECTION, SOLUTION INTRAVENOUS at 10:44:00

## 2019-10-31 RX ADMIN — ONDANSETRON 4 MG: 2 INJECTION INTRAMUSCULAR; INTRAVENOUS at 10:46:00

## 2019-10-31 RX ADMIN — DIPHENHYDRAMINE HYDROCHLORIDE 25 MG: 50 INJECTION INTRAMUSCULAR; INTRAVENOUS at 08:20:00

## 2019-10-31 RX ADMIN — ROCURONIUM BROMIDE 10 MG: 10 INJECTION, SOLUTION INTRAVENOUS at 10:14:00

## 2019-10-31 RX ADMIN — ROCURONIUM BROMIDE 50 MG: 10 INJECTION, SOLUTION INTRAVENOUS at 08:20:00

## 2019-10-31 RX ADMIN — EPHEDRINE SULFATE 5 MG: 50 INJECTION, SOLUTION INTRAVENOUS at 11:28:00

## 2019-10-31 RX ADMIN — EPHEDRINE SULFATE 2.5 MG: 50 INJECTION, SOLUTION INTRAVENOUS at 10:32:00

## 2019-10-31 RX ADMIN — EPHEDRINE SULFATE 5 MG: 50 INJECTION, SOLUTION INTRAVENOUS at 09:36:00

## 2019-10-31 RX ADMIN — EPHEDRINE SULFATE 5 MG: 50 INJECTION, SOLUTION INTRAVENOUS at 10:10:00

## 2019-10-31 RX ADMIN — DEXAMETHASONE SODIUM PHOSPHATE 8 MG: 4 MG/ML VIAL (ML) INJECTION at 08:20:00

## 2019-10-31 RX ADMIN — NEOSTIGMINE METHYLSULFATE 3 MG: 0.5 INJECTION INTRAVENOUS at 12:14:00

## 2019-10-31 RX ADMIN — HYDROMORPHONE HYDROCHLORIDE 0.3 MG: 1 INJECTION, SOLUTION INTRAMUSCULAR; INTRAVENOUS; SUBCUTANEOUS at 12:07:00

## 2019-10-31 RX ADMIN — LIDOCAINE HYDROCHLORIDE 25 MG: 10 INJECTION, SOLUTION EPIDURAL; INFILTRATION; INTRACAUDAL; PERINEURAL at 08:20:00

## 2019-10-31 RX ADMIN — ROCURONIUM BROMIDE 20 MG: 10 INJECTION, SOLUTION INTRAVENOUS at 08:59:00

## 2019-10-31 RX ADMIN — GLYCOPYRROLATE 0.6 MG: 0.2 INJECTION INTRAMUSCULAR; INTRAVENOUS at 12:14:00

## 2019-10-31 RX ADMIN — ROCURONIUM BROMIDE 10 MG: 10 INJECTION, SOLUTION INTRAVENOUS at 09:40:00

## 2019-10-31 RX ADMIN — HYDROMORPHONE HYDROCHLORIDE 0.2 MG: 1 INJECTION, SOLUTION INTRAMUSCULAR; INTRAVENOUS; SUBCUTANEOUS at 09:00:00

## 2019-10-31 RX ADMIN — CEFAZOLIN SODIUM/WATER 2 G: 2 G/20 ML SYRINGE (ML) INTRAVENOUS at 08:33:00

## 2019-10-31 NOTE — ANESTHESIA PROCEDURE NOTES
Airway  Date/Time: 10/31/2019 8:24 AM  Urgency: Elective    Airway not difficult    General Information and Staff    Patient location during procedure: OR  Anesthesiologist: Leidy Leonard DO  Performed: anesthesiologist     Indications and Patient Co

## 2019-10-31 NOTE — ANESTHESIA PROCEDURE NOTES
Peripheral IV  Date/Time: 10/31/2019 8:30 AM  Inserted by: Vivian Angeles DO    Placement  Needle size: 16 G  Laterality: left  Location: hand  Site prep: alcohol  Technique: anatomical landmarks  Attempts: 1

## 2019-10-31 NOTE — ANESTHESIA PREPROCEDURE EVALUATION
Anesthesia PreOp Note    HPI:     Finesse Fraire is a 48year old female who presents for preoperative consultation requested by: Nathaniel Talley MD    Date of Surgery: 10/31/2019    Procedure(s):  ABDOMINOPLASTY  Indication: Abdominal pannus Gale Lobe famotidine 40 MG Oral Tab, Take 40 mg by mouth daily. , Disp: , Rfl: , 10/30/2019  ibuprofen 600 MG Oral Tab, Take 600 mg by mouth every 6 (six) hours as needed for Pain., Disp: , Rfl: , 10/23/2019  GABAPENTIN 600 MG Oral Tab, TAKE ONE TABLET BY MOUTH TWICE Eletriptan Hydrobromide 40 MG Oral Tab, Take 1 tablet (40 mg total) by mouth as needed. , Disp: 9 tablet, Rfl: 5, More than a month at Unknown time  Resveratrol 250 MG Oral Cap, Take 2 capsules by mouth daily. , Disp: , Rfl: , 10/23/2019  Multiple Vitamins-M Comment:Tremors, horse voice, weight gain, hair loss  Hydrocodone             NAUSEA AND VOMITING  Propoxyphene            NAUSEA AND VOMITING  Sodium Pentobarbita*    NAUSEA AND VOMITING  Sulfa Antibiotics       NAUSEA AND VOMITING  Byetta Talks on phone: Not on file        Gets together: Not on file        Attends Islam service: Not on file        Active member of club or organization: Not on file        Attends meetings of clubs or organizations: Not on file        Relationship s ROS comment: No recent asthma sxs (last used inhaler 4 months ago)  Cardiovascular - negative ROS and normal exam    Neuro/Psych    (+)  neuromuscular disease, headaches,       Comments: Post concussion syndrome (left side of head), migraines every 2-3 wee

## 2019-10-31 NOTE — ANESTHESIA POSTPROCEDURE EVALUATION
Patient: Nicola Bui    Procedure Summary     Date:  10/31/19 Room / Location:  [address] [place] [address] MAIN OR 01 / 300 [facility] MAIN OR    Anesthesia Start:  2037 Anesthesia Stop:  2772    Procedure:  ABDOMINOPLASTY (N/A Abdomen) Diagnosis:       Abdominal pannus      (Abdominal pa

## 2019-10-31 NOTE — BRIEF OP NOTE
Pre-Operative Diagnosis: Abdominal pannus [E65]     Post-Operative Diagnosis: Abdominal pannus [E65]      Procedure Performed:   Procedure(s):  ilioinguinal nerve release BL and nerve wrap placement  and abdominoplasty    Surgeon(s) and Role:     Lilia Blackman,

## 2019-11-01 VITALS
RESPIRATION RATE: 18 BRPM | HEART RATE: 73 BPM | BODY MASS INDEX: 28.13 KG/M2 | HEIGHT: 66 IN | SYSTOLIC BLOOD PRESSURE: 139 MMHG | WEIGHT: 175 LBS | TEMPERATURE: 98 F | OXYGEN SATURATION: 96 % | DIASTOLIC BLOOD PRESSURE: 83 MMHG

## 2019-11-01 PROCEDURE — 99217 OBSERVATION CARE DISCHARGE: CPT | Performed by: HOSPITALIST

## 2019-11-01 RX ORDER — HYDROMORPHONE HYDROCHLORIDE 2 MG/1
1 TABLET ORAL EVERY 2 HOUR PRN
Status: DISCONTINUED | OUTPATIENT
Start: 2019-11-01 | End: 2019-11-01

## 2019-11-01 RX ORDER — LORAZEPAM 0.5 MG
1 TABLET ORAL DAILY
COMMUNITY

## 2019-11-01 RX ORDER — HYDROMORPHONE HYDROCHLORIDE 2 MG/1
1-2 TABLET ORAL
Qty: 40 TABLET | Refills: 0 | Status: SHIPPED | OUTPATIENT
Start: 2019-11-01 | End: 2020-01-17

## 2019-11-01 RX ORDER — NALOXONE HYDROCHLORIDE 4 MG/.1ML
4 SPRAY, METERED NASAL AS NEEDED
Qty: 1 KIT | Refills: 0 | Status: SHIPPED | OUTPATIENT
Start: 2019-11-01 | End: 2020-01-23

## 2019-11-01 NOTE — DISCHARGE SUMMARY
[facility]    Discharge Summary    Brigette Girard Patient Status:  Observation    1965 MRN [id number]   Location [facility] 4W/SW/SE Attending Richard Jules MD   Hosp Day # 0 PCP Dolly Dey     Date of [address] days.   Stop taking on:  November 7, 2019  Quantity:  28 capsule  Refills:  0     docusate sodium 100 MG Caps  Commonly known as:  COLACE      Take 1 capsule (100 mg total) by mouth 2 (two) times daily.    Quantity:  60 capsule  Refills:  0     HYDROcodone- Refills:  0     cetirizine 10 MG Tabs  Commonly known as:  ZYRTEC      Take 10 mg by mouth daily. Refills:  0     Cholecalciferol 125 MCG (5000 UT) Tabs  Commonly known as:  VITAMIN D-3      Take 1 tablet by mouth daily.    Refills:  0     CONTOUR NEXT TE ACIDOPHILUS BEADS OR      Take 1 tablet by mouth daily. Refills:  0     REFRESH CELLUVISC 1 % Gel  Generic drug:  carboxymethylcellulose sod PF      Place 1 drop into both eyes as needed.    Refills:  0     Resveratrol 250 MG Caps      Take 2 capsules by SURGERY, PLASTIC  Contact information:  [address] Discharge Diagnoses: S/p abdominoplasty    Lace+ Score: 16  59-90 High Risk  29-58 Medium Risk  0-28   Low Risk.     TCM Follow-

## 2019-11-01 NOTE — OPERATIVE REPORT
[facility]    PATIENT'S NAME: SERGO WOLFE   ATTENDING PHYSICIAN: Aline Sawant MD   OPERATING PHYSICIAN: Aline Sawant MD   PATIENT ACCOUNT#:   [id number]    LOCATION:  [id number] [facility] [place] #:   [id number]       DATE OF BIRTH:  [birth year] hypersensitivity, numbness in the abdominal and genital area, increased pain, and need for further surgery. Patient understands and wishes to proceed. Questions were answered.     OPERATIVE TECHNIQUE:  Patient was identified in the preoperative area, info AxoGen nerve protector tube and also at the exit site out of the fascia a small piece of autologous fat grafting was placed for further cushioning of the nerve.   The scar tissue at the trigger site that was sent to Pathology was a small portion of what nanda Talisha fascia along the incision and 3-0 Vicryl sutures were used for the deep dermal closure, followed by 4-0 Monocryl subcuticular sutures for the skin. The umbilicus was inset with 3-0 and 4-0 interrupted Vicryl sutures.   Dermabond and Steri-Strips wer

## 2019-11-01 NOTE — PLAN OF CARE
Patient tolerating clears, denies nausea. Advanced to general diet, tolerating well. Pain managed with Dilaudid. Able to void. BRITNEY drain sites leaking bilaterally, dressings reinforced. Abdominal binder on, steri strips CDI underneath. Accu checks ACHS.  Sco measures as appropriate and evaluate response  - Consider cultural and social influences on pain and pain management  - Manage/alleviate anxiety  - Utilize distraction and/or relaxation techniques  - Monitor for opioid side effects  - Notify MD/LIP if inte Problem: METABOLIC/FLUID AND ELECTROLYTES - ADULT  Goal: Glucose maintained within prescribed range  Description  INTERVENTIONS:  - Monitor Blood Glucose as ordered  - Assess for signs and symptoms of hyperglycemia and hypoglycemia  - Administer ordered

## 2019-11-08 ENCOUNTER — OFFICE VISIT (OUTPATIENT)
Dept: SURGERY | Facility: CLINIC | Age: 54
End: 2019-11-08
Payer: COMMERCIAL

## 2019-11-08 DIAGNOSIS — S31.109D OPEN WOUND OF ABDOMEN, SUBSEQUENT ENCOUNTER: Primary | ICD-10-CM

## 2019-11-08 PROCEDURE — 99024 POSTOP FOLLOW-UP VISIT: CPT | Performed by: PHYSICIAN ASSISTANT

## 2019-11-08 NOTE — PROGRESS NOTES
This is a 59-year-old female that is 8 days status post her abdominoplasty and ilioinguinal nerve release and decompression with Axogen nerve protector placement bilaterally and autologous fat grafting around the nerve decompression sites bilaterally.   Eladio Kearns

## 2019-11-13 ENCOUNTER — OFFICE VISIT (OUTPATIENT)
Dept: SURGERY | Facility: CLINIC | Age: 54
End: 2019-11-13
Payer: COMMERCIAL

## 2019-11-13 DIAGNOSIS — Z79.2 PROPHYLACTIC ANTIBIOTIC: Primary | ICD-10-CM

## 2019-11-13 DIAGNOSIS — E65 ABDOMINAL PANNUS: ICD-10-CM

## 2019-11-13 DIAGNOSIS — G57.81: ICD-10-CM

## 2019-11-13 PROCEDURE — 99024 POSTOP FOLLOW-UP VISIT: CPT | Performed by: SURGERY

## 2019-11-13 RX ORDER — ACETAMINOPHEN 500 MG
500 TABLET ORAL EVERY 6 HOURS PRN
COMMUNITY

## 2019-11-14 NOTE — PROGRESS NOTES
Lois Bautista is a 48year old female who presents today for a follow-up after ilioinguinal and iliohypograstric nerve release and abdominoplasty      She denies fever and chills. She denies nausea, vomiting, diarrhea or constipation.    Her pain is controll

## 2019-11-20 ENCOUNTER — TELEPHONE (OUTPATIENT)
Dept: SURGERY | Facility: CLINIC | Age: 54
End: 2019-11-20

## 2019-11-20 NOTE — TELEPHONE ENCOUNTER
I called and spoke with the patient who called to review local wound care instructions for the abdominal dressings-  She is currently applying Bactroban as instructed and covering with a xeroform dressing.    As she is out of xeroform dressing, she is askin

## 2019-11-25 ENCOUNTER — TELEPHONE (OUTPATIENT)
Dept: NEUROLOGY | Facility: CLINIC | Age: 54
End: 2019-11-25

## 2019-11-25 ENCOUNTER — NURSE ONLY (OUTPATIENT)
Dept: SURGERY | Facility: CLINIC | Age: 54
End: 2019-11-25
Payer: COMMERCIAL

## 2019-11-25 DIAGNOSIS — G43.709 CHRONIC MIGRAINE W/O AURA W/O STATUS MIGRAINOSUS, NOT INTRACTABLE: Primary | ICD-10-CM

## 2019-11-25 NOTE — PROGRESS NOTES
The patient presents today for left abdominal drain assessment and overall wound check-  Per patient's written record, there has been less than 20 cc serous output over the past two days consecutively.     This drain was removed due to low output and the pa

## 2019-12-02 ENCOUNTER — TELEPHONE (OUTPATIENT)
Dept: NEUROLOGY | Facility: CLINIC | Age: 54
End: 2019-12-02

## 2019-12-04 ENCOUNTER — OFFICE VISIT (OUTPATIENT)
Dept: SURGERY | Facility: CLINIC | Age: 54
End: 2019-12-04
Payer: COMMERCIAL

## 2019-12-04 DIAGNOSIS — G57.82 COMPRESSION NEUROPATHY OF LEFT ILIOINGUINAL NERVE: ICD-10-CM

## 2019-12-04 DIAGNOSIS — G57.81: Primary | ICD-10-CM

## 2019-12-04 PROCEDURE — 99024 POSTOP FOLLOW-UP VISIT: CPT | Performed by: SURGERY

## 2019-12-04 RX ORDER — GABAPENTIN 600 MG/1
TABLET ORAL
Qty: 180 TABLET | Refills: 0 | Status: SHIPPED | OUTPATIENT
Start: 2019-12-04 | End: 2020-03-02

## 2019-12-04 RX ORDER — ZOLMITRIPTAN 5 MG/1
SPRAY NASAL
Qty: 12 EACH | Refills: 5 | Status: SHIPPED | OUTPATIENT
Start: 2019-12-04 | End: 2020-01-17

## 2019-12-04 NOTE — PROGRESS NOTES
Claudia Nova is a 48year old female who presents today for a follow-up after ilioinguinal and iliohypograstric nerve release and abdominoplasty        She denies fever and chills. She denies nausea, vomiting, diarrhea or constipation.    Her pain is contro

## 2019-12-04 NOTE — TELEPHONE ENCOUNTER
Medication: Gabapentin 600 mg & Zolmitriptan 5 mg nasal    Date of last refill:10/03/2019 & 06/05/2019 with 3 addt refills      Last office visit: 7/1/2019  Due back to clinic per last office note: no appt noted  Date next office visit scheduled:      Last

## 2019-12-09 NOTE — TELEPHONE ENCOUNTER
Received faxed from [employer]. The request for Aimovig 70 mg/ml auto injectors has not been approved for benefits.      In order to approve coverage of this request: you must not be using botulinum toxin (like botox, dysport or myobloc) to help

## 2019-12-09 NOTE — TELEPHONE ENCOUNTER
Talked with [facility] representative. Explained to her that office staff made a mistake in answering one of the questions. They asked us to write a letter and send it with denial letter. Fax the letter: [phone].     PA team at [facility]

## 2019-12-18 NOTE — TELEPHONE ENCOUNTER
Talked with [employer] representative, they said the appeal for Sonia Rodriguez is under review by clinical team until 12/24/2019.

## 2019-12-23 DIAGNOSIS — G43.709 CHRONIC MIGRAINE W/O AURA W/O STATUS MIGRAINOSUS, NOT INTRACTABLE: Primary | ICD-10-CM

## 2019-12-23 NOTE — TELEPHONE ENCOUNTER
Medication: Aimovig 70 MG/ML     Date of last refill: 01/25/2019 (#3pen/3)  [place]     Last office visit: 7/1/2019  Due back to clinic per last office note:    Date next office visit scheduled:    Future Appointments

## 2019-12-23 NOTE — TELEPHONE ENCOUNTER
Received faxed from [employer]    PA request for aimovig 70 MG/ML auto-injector has been approved. Approved: 12/23/2019-03/23/2020. Copy placed in approval bin.

## 2020-01-17 ENCOUNTER — OFFICE VISIT (OUTPATIENT)
Dept: NEUROLOGY | Facility: CLINIC | Age: 55
End: 2020-01-17
Payer: COMMERCIAL

## 2020-01-17 VITALS
HEART RATE: 95 BPM | SYSTOLIC BLOOD PRESSURE: 124 MMHG | BODY MASS INDEX: 28.13 KG/M2 | DIASTOLIC BLOOD PRESSURE: 80 MMHG | WEIGHT: 175 LBS | RESPIRATION RATE: 16 BRPM | HEIGHT: 66 IN

## 2020-01-17 DIAGNOSIS — F07.81 POST CONCUSSION SYNDROME: ICD-10-CM

## 2020-01-17 DIAGNOSIS — M79.18 CERVICAL MYOFASCIAL PAIN SYNDROME: ICD-10-CM

## 2020-01-17 DIAGNOSIS — R42 DIZZINESS AND GIDDINESS: ICD-10-CM

## 2020-01-17 DIAGNOSIS — G43.709 CHRONIC MIGRAINE W/O AURA W/O STATUS MIGRAINOSUS, NOT INTRACTABLE: Primary | ICD-10-CM

## 2020-01-17 PROCEDURE — 99214 OFFICE O/P EST MOD 30 MIN: CPT | Performed by: OTHER

## 2020-01-17 RX ORDER — ZOLMITRIPTAN 5 MG/1
SPRAY NASAL
Qty: 12 EACH | Refills: 5 | Status: SHIPPED | OUTPATIENT
Start: 2020-01-17 | End: 2020-10-26

## 2020-01-17 NOTE — PROGRESS NOTES
[facility] with [address]  12/31/1965  Primary Care Provider:  Joseph Kauffman    1/17/2020  Accompanied visit: daughter    ( ) No.        47year old yo patient being seen Metoclopramide HCl 10 MG Oral Tab, Take 1 tablet (10 mg total) by mouth 3 (three) times daily as needed. , Disp: 20 tablet, Rfl: 0  •  ondansetron 4 MG Oral Tablet Dispersible, Take 1 tablet (4 mg total) by mouth every 4 (four) hours as needed for Nausea. , Disp: , Rfl:   •  aspirin 81 MG Oral Tab, Take 81 mg by mouth daily. , Disp: , Rfl:   •  TURMERIC OR, Take 1 capsule by mouth daily. , Disp: , Rfl:   •  Multiple Vitamins-Minerals (MULTIVITAMIN OR), Take 1 tablet by mouth daily.   , Disp: , Rfl:   •  Pantop for Acupuncture    (x) Discussed potential side effects of any treatment relevant to above. Includes explanation of tests as necessary. Return in about 6 months (around 7/17/2020).       Patient understands that if needed, based on condition and or test

## 2020-02-05 ENCOUNTER — TELEPHONE (OUTPATIENT)
Dept: NEUROLOGY | Facility: CLINIC | Age: 55
End: 2020-02-05

## 2020-02-05 DIAGNOSIS — G43.709 CHRONIC MIGRAINE WITHOUT AURA WITHOUT STATUS MIGRAINOSUS, NOT INTRACTABLE: Primary | ICD-10-CM

## 2020-02-21 NOTE — TELEPHONE ENCOUNTER
From: Robert Lozano  To: Fanny Frederick MD  Sent: 2/20/2020 3:18 PM CST  Subject: Prescription Question    My Chiropractor needs an actual prescription and referal sent to her so she can work with insurance.  I need script for acupuncture for migraines a

## 2020-03-02 ENCOUNTER — TELEPHONE (OUTPATIENT)
Dept: NEUROLOGY | Facility: CLINIC | Age: 55
End: 2020-03-02

## 2020-03-02 RX ORDER — GABAPENTIN 600 MG/1
TABLET ORAL
Qty: 180 TABLET | Refills: 1 | Status: SHIPPED | OUTPATIENT
Start: 2020-03-02 | End: 2020-08-14

## 2020-03-02 NOTE — TELEPHONE ENCOUNTER
Medication: Gabapentin 600 mg    Date of last refill:12/04/2019      Last office visit: 1/17/2020  Due back to clinic per last office note: RTN in 6 months  Date next office visit scheduled:    Future Appointments   Date Time Provider Vinny Jaramillo

## 2020-03-02 NOTE — TELEPHONE ENCOUNTER
Referral faxed to [phone], receipt rec'd, mailed also to  Dr Cleotilde Dancer  #[phone] [phone]

## 2020-04-30 DIAGNOSIS — G43.709 CHRONIC MIGRAINE W/O AURA W/O STATUS MIGRAINOSUS, NOT INTRACTABLE: ICD-10-CM

## 2020-04-30 NOTE — TELEPHONE ENCOUNTER
Medication: Aimovig    Date of last refill: 12/23/2019 (#1/3)  [place] this medication    Last office visit: 1/17/2020  Due back to clinic per last office note:  RTC in 6 months  Date next office visit scheduled:

## 2020-06-10 ENCOUNTER — OFFICE VISIT (OUTPATIENT)
Dept: SURGERY | Facility: CLINIC | Age: 55
End: 2020-06-10
Payer: COMMERCIAL

## 2020-06-10 VITALS — WEIGHT: 182 LBS | BODY MASS INDEX: 29 KG/M2

## 2020-06-10 DIAGNOSIS — E65 ABDOMINAL PANNUS: Primary | ICD-10-CM

## 2020-06-10 PROBLEM — S31.109A OPEN WOUND OF ABDOMEN: Status: RESOLVED | Noted: 2019-06-05 | Resolved: 2020-06-10

## 2020-06-10 PROCEDURE — 99213 OFFICE O/P EST LOW 20 MIN: CPT | Performed by: SURGERY

## 2020-06-10 RX ORDER — SENNOSIDES 8.6 MG
8.6 TABLET ORAL DAILY
COMMUNITY

## 2020-06-10 NOTE — CONSULTS
Estabilshed Patient Consultation    Chief Complaint: f u after iliohypogastric nerve entrapment release and abdominoplasty     History of Present Illness:   Lois Bautista is a 47year old female who returns to the office after abdominoplasty and iliohypogas times daily. , Disp: , Rfl:   Esomeprazole Magnesium 20 MG Oral Capsule Delayed Release, Take 20 mg by mouth every morning before breakfast., Disp: , Rfl:   ibuprofen 600 MG Oral Tab, Take 600 mg by mouth every 6 (six) hours as needed for Pain., Disp: , Rfl Disp: , Rfl:   Omega-3 1400 MG Oral Cap, Take 2 capsules by mouth daily. , Disp: , Rfl:   carboxymethylcellulose sod PF (REFRESH CELLUVISC) 1 % Ophthalmic Gel, Place 1 drop into both eyes as needed.   , Disp: , Rfl:   Rosuvastatin Calcium 20 MG Oral Tab, T reflux) Father    • Other (anticardiolipin antibodies) Sister    • High Blood Pressure Brother    • High Cholesterol Brother    • Diabetes Brother    • Other (distonia) Brother    • Other (MS) Brother    • Arthritis Sister    • Other (anticardiolipin antib She will schedule thi for some time in the fall. The different treatment options were discussed with the patient. The procedures and the postoperative care was discussed in detail.   Potential risks complications benefits and alternatives were discus

## 2020-07-22 ENCOUNTER — OFFICE VISIT (OUTPATIENT)
Dept: NEUROLOGY | Facility: CLINIC | Age: 55
End: 2020-07-22
Payer: COMMERCIAL

## 2020-07-22 VITALS
RESPIRATION RATE: 16 BRPM | DIASTOLIC BLOOD PRESSURE: 82 MMHG | TEMPERATURE: 98 F | WEIGHT: 182 LBS | SYSTOLIC BLOOD PRESSURE: 128 MMHG | HEIGHT: 67 IN | BODY MASS INDEX: 28.56 KG/M2 | HEART RATE: 77 BPM

## 2020-07-22 DIAGNOSIS — G43.709 CHRONIC MIGRAINE W/O AURA W/O STATUS MIGRAINOSUS, NOT INTRACTABLE: Primary | ICD-10-CM

## 2020-07-22 DIAGNOSIS — G43.709 CHRONIC MIGRAINE WITHOUT AURA WITHOUT STATUS MIGRAINOSUS, NOT INTRACTABLE: ICD-10-CM

## 2020-07-22 DIAGNOSIS — F07.81 POST CONCUSSION SYNDROME: ICD-10-CM

## 2020-07-22 PROCEDURE — 3008F BODY MASS INDEX DOCD: CPT | Performed by: OTHER

## 2020-07-22 PROCEDURE — 99214 OFFICE O/P EST MOD 30 MIN: CPT | Performed by: OTHER

## 2020-07-22 PROCEDURE — 3074F SYST BP LT 130 MM HG: CPT | Performed by: OTHER

## 2020-07-22 PROCEDURE — 3079F DIAST BP 80-89 MM HG: CPT | Performed by: OTHER

## 2020-07-22 RX ORDER — CLONAZEPAM 0.25 MG/1
0.12 TABLET, ORALLY DISINTEGRATING ORAL 2 TIMES DAILY PRN
Qty: 30 TABLET | Refills: 0 | Status: SHIPPED | OUTPATIENT
Start: 2020-07-22 | End: 2021-02-10

## 2020-07-22 NOTE — PROGRESS NOTES
[facility] with [address]  12/31/1965  Primary Care Provider:  Junie Das    7/22/2020  Accompanied visit:     () No.        47year old yo patient being seen fo Nausea., Disp: 10 tablet, Rfl: 0  •  Resveratrol 250 MG Oral Cap, Take 2 capsules by mouth daily. , Disp: , Rfl:   •  Multiple Vitamins-Minerals (HAIR/SKIN/NAILS) Oral Tab, Take 1 tablet by mouth daily.   , Disp: , Rfl:   •  Cholecalciferol (VITAMIN D-3) 500 RASH  Latex                   RASH  Nickel                  RASH  Opioid Analgesics       OTHER (SEE COMMENTS)    Comment:Severe vasovagel  Peanuts                 ANAPHYLAXIS  Topiramate              ANAPHYLAXIS  Valium [Diazepam]       OTHER (SEE COMMENT condition and or test results, follow up will be readjusted      Mariela Tripp MD  Vascular & General Neurology  Director, Multiple Sclerosis Program  [facility]  7/22/2020, Time completed 10:30 AM    Decision making:  ( x ) labs revi

## 2020-08-04 DIAGNOSIS — G43.709 CHRONIC MIGRAINE W/O AURA W/O STATUS MIGRAINOSUS, NOT INTRACTABLE: ICD-10-CM

## 2020-08-04 RX ORDER — ERENUMAB-AOOE 70 MG/ML
INJECTION SUBCUTANEOUS
Qty: 1 ML | Refills: 0 | OUTPATIENT
Start: 2020-08-04

## 2020-08-04 NOTE — TELEPHONE ENCOUNTER
Medication: Aimovig 140 mg    Date of last refill: 2020   [place]     Last office visit: 2020  Due back to clinic per last office note:  RTN in 4 months  Date next office visit scheduled:    Future Appo

## 2020-08-14 DIAGNOSIS — M79.18 CERVICAL MYOFASCIAL PAIN SYNDROME: Primary | ICD-10-CM

## 2020-08-14 RX ORDER — GABAPENTIN 600 MG/1
TABLET ORAL
Qty: 180 TABLET | Refills: 1 | Status: SHIPPED | OUTPATIENT
Start: 2020-08-14 | End: 2020-09-11

## 2020-08-14 NOTE — TELEPHONE ENCOUNTER
Medication: GABAPENTIN 600 MG Oral Tab    Date of last refill: 3/2/20 (#180/1)  [place]    Last office visit: 7/22/2020  Due back to clinic per last office note:  4 months  Date next office visit scheduled:    Future

## 2020-10-12 ENCOUNTER — TELEPHONE (OUTPATIENT)
Dept: NEUROLOGY | Facility: CLINIC | Age: 55
End: 2020-10-12

## 2020-10-12 DIAGNOSIS — G43.709 CHRONIC MIGRAINE W/O AURA W/O STATUS MIGRAINOSUS, NOT INTRACTABLE: Primary | ICD-10-CM

## 2020-10-15 NOTE — TELEPHONE ENCOUNTER
RN received paperwork from Kathryn Badillo requesting additional information. Faxed to [phone]. Fax confirmation received.

## 2020-10-26 RX ORDER — ZOLMITRIPTAN 5 MG/1
SPRAY, METERED NASAL
Qty: 12 EACH | Refills: 5 | Status: SHIPPED | OUTPATIENT
Start: 2020-10-26 | End: 2021-05-05

## 2020-10-26 NOTE — TELEPHONE ENCOUNTER
Medication: Zolmitriptan 5 mg     Date of last refill: 01/17/20 with 5 addtrefills  [place]     Last office visit: 7/22/2020  Due back to clinic per last office note:  4 months  Date next office visit scheduled:

## 2020-11-04 ENCOUNTER — OFFICE VISIT (OUTPATIENT)
Dept: SURGERY | Facility: CLINIC | Age: 55
End: 2020-11-04
Payer: COMMERCIAL

## 2020-11-04 DIAGNOSIS — E65 ABDOMINAL PANNUS: Primary | ICD-10-CM

## 2020-11-04 PROCEDURE — 12031 INTMD RPR S/A/T/EXT 2.5 CM/<: CPT | Performed by: SURGERY

## 2020-11-04 PROCEDURE — 11401 EXC TR-EXT B9+MARG 0.6-1 CM: CPT | Performed by: SURGERY

## 2020-11-04 PROCEDURE — 99213 OFFICE O/P EST LOW 20 MIN: CPT | Performed by: SURGERY

## 2020-11-04 PROCEDURE — 99072 ADDL SUPL MATRL&STAF TM PHE: CPT | Performed by: SURGERY

## 2020-11-04 NOTE — PROGRESS NOTES
Estabilshed Patient Consultation    Chief Complaint: f u after iliohypogastric nerve entrapment release and abdominoplasty      History of Present Illness:   Pretty Juarez is a 47year old female who returns to the office after abdominoplasty and iliohypoga with Matt Graham MD.        Allergies:      Adhesive Tape           RASH  Latex                   RASH  Nickel                  RASH  Opioid Analgesics       OTHER (SEE COMMENTS)    Comment:Severe vasovagel  Peanuts                 ANAPHYLAXIS  Topiram Conjunctiva are clear, non-icteric. PERRL    ENT: no obvious abnormality, no ear drainage, mucous membranes moist and pink    Integument/Skin: The skin appears normal. There are no suspicious appearing rashes or lesions.     Respiratory: Normal respiratory

## 2020-12-17 ENCOUNTER — TELEPHONE (OUTPATIENT)
Dept: NEUROLOGY | Facility: CLINIC | Age: 55
End: 2020-12-17

## 2020-12-17 ENCOUNTER — OFFICE VISIT (OUTPATIENT)
Dept: NEUROLOGY | Facility: CLINIC | Age: 55
End: 2020-12-17
Payer: COMMERCIAL

## 2020-12-17 VITALS
HEIGHT: 66.5 IN | HEART RATE: 68 BPM | DIASTOLIC BLOOD PRESSURE: 70 MMHG | SYSTOLIC BLOOD PRESSURE: 120 MMHG | WEIGHT: 177 LBS | BODY MASS INDEX: 28.11 KG/M2 | RESPIRATION RATE: 16 BRPM

## 2020-12-17 DIAGNOSIS — G44.021 INTRACTABLE CHRONIC CLUSTER HEADACHE: Primary | ICD-10-CM

## 2020-12-17 DIAGNOSIS — F07.81 POST CONCUSSION SYNDROME: ICD-10-CM

## 2020-12-17 DIAGNOSIS — G43.709 CHRONIC MIGRAINE W/O AURA W/O STATUS MIGRAINOSUS, NOT INTRACTABLE: ICD-10-CM

## 2020-12-17 DIAGNOSIS — M79.18 CERVICAL MYOFASCIAL PAIN SYNDROME: ICD-10-CM

## 2020-12-17 PROCEDURE — 3078F DIAST BP <80 MM HG: CPT | Performed by: OTHER

## 2020-12-17 PROCEDURE — 3074F SYST BP LT 130 MM HG: CPT | Performed by: OTHER

## 2020-12-17 PROCEDURE — 3008F BODY MASS INDEX DOCD: CPT | Performed by: OTHER

## 2020-12-17 PROCEDURE — 99214 OFFICE O/P EST MOD 30 MIN: CPT | Performed by: OTHER

## 2020-12-17 RX ORDER — ERENUMAB-AOOE 70 MG/ML
70 INJECTION SUBCUTANEOUS ONCE
COMMUNITY
End: 2020-12-31 | Stop reason: ALTCHOICE

## 2020-12-17 RX ORDER — GALCANEZUMAB 100 MG/ML
300 INJECTION, SOLUTION SUBCUTANEOUS
Qty: 3 SYRINGE | Refills: 0 | Status: SHIPPED | OUTPATIENT
Start: 2020-12-17 | End: 2021-02-01

## 2020-12-17 NOTE — TELEPHONE ENCOUNTER
Prior authorization requested for Emgality 300 mg monthly for cluster headaches.  Case Del Valle#: SERGO AIDEN (Key: [id number]) pending insurance review and approval.

## 2020-12-17 NOTE — PROGRESS NOTES
[facility] with [address]  12/31/1965  Primary Care Provider:  Vicki Zafar    12/17/2020  Accompanied visit:      () No.      47year old yo patient being seen fo 600 MG Oral Tab, Take 600 mg by mouth every 6 (six) hours as needed for Pain., Disp: , Rfl:   •  acetaminophen 500 MG Oral Tab, Take 500 mg by mouth every 6 (six) hours as needed for Pain., Disp: , Rfl:   •  Misc Natural Products (TART CHERRY ADVANCED) Dave Garzon (thirty) days. , Disp: , Rfl:   •  aspirin 81 MG Oral Tab, Take 81 mg by mouth daily. , Disp: , Rfl:   •  TURMERIC OR, Take 1 capsule by mouth daily. , Disp: , Rfl:   •  Multiple Vitamins-Minerals (MULTIVITAMIN OR), Take 1 tablet by mouth daily.   , Disp: syndrome    Discussion plus Diagnostics & Treatment Orders:  Stop Karrie Starring and proceed with EMGALITY using 300 mg once a month until VELÁSQUEZ disappears protocol for Cluster HEADACHE    Also try spare botox to left frontal and supraorbital    (x) Discussed potent

## 2021-01-04 ENCOUNTER — TELEPHONE (OUTPATIENT)
Dept: NEUROLOGY | Facility: CLINIC | Age: 56
End: 2021-01-04

## 2021-01-04 DIAGNOSIS — G44.021 INTRACTABLE CHRONIC CLUSTER HEADACHE: Primary | ICD-10-CM

## 2021-01-06 NOTE — TELEPHONE ENCOUNTER
Fax received from [employer]. Patient use of Zomig 5 mg nasal spray has been reviewed and approved. Approval granted 1/31/2021 - 1/31/2022.

## 2021-01-30 DIAGNOSIS — G44.021 INTRACTABLE CHRONIC CLUSTER HEADACHE: ICD-10-CM

## 2021-02-01 RX ORDER — GALCANEZUMAB 100 MG/ML
INJECTION, SOLUTION SUBCUTANEOUS
Qty: 3 ML | Refills: 0 | Status: SHIPPED | OUTPATIENT
Start: 2021-02-01 | End: 2021-03-01

## 2021-02-01 NOTE — TELEPHONE ENCOUNTER
Medication: Emgality    Date of last refill: 12/17/2020 (#3 syringes/0)  [place]     Last office visit: 12/17/2021  Due back to clinic per last office note:  3 months  Date next office visit scheduled:    Future Appointm

## 2021-02-28 DIAGNOSIS — G44.021 INTRACTABLE CHRONIC CLUSTER HEADACHE: ICD-10-CM

## 2021-03-01 RX ORDER — GALCANEZUMAB 100 MG/ML
INJECTION, SOLUTION SUBCUTANEOUS
Qty: 3 ML | Refills: 5 | Status: SHIPPED | OUTPATIENT
Start: 2021-03-01 | End: 2021-03-19

## 2021-03-01 NOTE — TELEPHONE ENCOUNTER
Medication: Emgality 300 mg     Date of last refill: 02/01/21  [place]      Last office visit: 12/17/2021  Due back to clinic per last office note:  3 months  Date next office visit scheduled:  03/19/21              Last

## 2021-03-19 ENCOUNTER — OFFICE VISIT (OUTPATIENT)
Dept: NEUROLOGY | Facility: CLINIC | Age: 56
End: 2021-03-19
Payer: COMMERCIAL

## 2021-03-19 VITALS
DIASTOLIC BLOOD PRESSURE: 84 MMHG | SYSTOLIC BLOOD PRESSURE: 120 MMHG | WEIGHT: 184.5 LBS | HEART RATE: 80 BPM | RESPIRATION RATE: 18 BRPM | HEIGHT: 66.5 IN | BODY MASS INDEX: 29.3 KG/M2

## 2021-03-19 DIAGNOSIS — G43.709 CHRONIC MIGRAINE W/O AURA W/O STATUS MIGRAINOSUS, NOT INTRACTABLE: Primary | ICD-10-CM

## 2021-03-19 DIAGNOSIS — M79.18 CERVICAL MYOFASCIAL PAIN SYNDROME: ICD-10-CM

## 2021-03-19 PROCEDURE — 3079F DIAST BP 80-89 MM HG: CPT | Performed by: OTHER

## 2021-03-19 PROCEDURE — 3074F SYST BP LT 130 MM HG: CPT | Performed by: OTHER

## 2021-03-19 PROCEDURE — 3008F BODY MASS INDEX DOCD: CPT | Performed by: OTHER

## 2021-03-19 PROCEDURE — 99214 OFFICE O/P EST MOD 30 MIN: CPT | Performed by: OTHER

## 2021-03-19 RX ORDER — HYDROXYCHLOROQUINE SULFATE 200 MG/1
200 TABLET, FILM COATED ORAL DAILY
COMMUNITY
Start: 2021-01-05

## 2021-03-19 RX ORDER — ERENUMAB-AOOE 140 MG/ML
140 INJECTION, SOLUTION SUBCUTANEOUS
Qty: 1 ML | Refills: 11 | Status: SHIPPED | OUTPATIENT
Start: 2021-03-19 | End: 2022-03-19

## 2021-03-19 RX ORDER — ONDANSETRON HYDROCHLORIDE 4 MG/5ML
4 SOLUTION ORAL ONCE
COMMUNITY

## 2021-03-19 NOTE — PROGRESS NOTES
[facility] with [address]  12/31/1965  Primary Care Provider:  Emilia Sam    3/19/2021  Accompanied visit:      (x) No.      54year old yo patient being seen for:  Yunior Preez daily., Disp: , Rfl:   •  Insulin Aspart Protamine & Aspart 70/30 (70-30) 100 UNIT/ML Subcutaneous Suspension, Inject into the skin. Uses sliding scale , Disp: , Rfl:   •  Resveratrol 250 MG Oral Cap, Take 2 capsules by mouth daily. , Disp: , Rfl:   •  Mult Allergies:    Adhesive Tape           RASH  Latex                   RASH  Nickel                  RASH  Opioid Analgesics       OTHER (SEE COMMENTS)    Comment:Severe vasovagel  Peanuts                 ANAPHYLAXIS  Topiramate              ANAPHYLAXIS Case/studies discussed with other caregivers - -non F2F  (  ) Telephone time with patiern or authorized [facility] member--non F2F  ( x ) other records reviewed --non F2F including consultations  (  ) [facility] meetings - patient not present --non F2F  (  ) Independent

## 2021-05-05 RX ORDER — ZOLMITRIPTAN 5 MG/1
SPRAY, METERED NASAL
Qty: 12 EACH | Refills: 5 | Status: SHIPPED | OUTPATIENT
Start: 2021-05-05 | End: 2021-11-23

## 2021-05-05 NOTE — TELEPHONE ENCOUNTER
Medication: Zomig 5 mg     Date of last refill: 10/26/20 with 5 addt refills  [place]      Last office visit: 12/17/2021  Due back to clinic per last office note:  3 months  Date next office visit scheduled:  03/19/21

## 2021-08-25 ENCOUNTER — OFFICE VISIT (OUTPATIENT)
Dept: ORTHOPEDICS CLINIC | Facility: CLINIC | Age: 56
End: 2021-08-25
Payer: COMMERCIAL

## 2021-08-25 VITALS — HEART RATE: 84 BPM | OXYGEN SATURATION: 98 %

## 2021-08-25 DIAGNOSIS — R26.9 GAIT ABNORMALITY: ICD-10-CM

## 2021-08-25 DIAGNOSIS — M79.671 FOOT PAIN, BILATERAL: ICD-10-CM

## 2021-08-25 DIAGNOSIS — M79.672 FOOT PAIN, BILATERAL: ICD-10-CM

## 2021-08-25 DIAGNOSIS — E11.69 TYPE 2 DIABETES MELLITUS WITH OTHER SPECIFIED COMPLICATION, WITH LONG-TERM CURRENT USE OF INSULIN (HCC): ICD-10-CM

## 2021-08-25 DIAGNOSIS — R26.89 BALANCE DISORDER: ICD-10-CM

## 2021-08-25 DIAGNOSIS — Z79.4 TYPE 2 DIABETES MELLITUS WITH OTHER SPECIFIED COMPLICATION, WITH LONG-TERM CURRENT USE OF INSULIN (HCC): ICD-10-CM

## 2021-08-25 DIAGNOSIS — G62.9 NEUROPATHY: Primary | ICD-10-CM

## 2021-08-25 PROCEDURE — 99213 OFFICE O/P EST LOW 20 MIN: CPT | Performed by: PODIATRIST

## 2021-08-25 RX ORDER — FLASH GLUCOSE SCANNING READER
EACH MISCELLANEOUS
COMMUNITY
Start: 2021-03-22

## 2021-08-25 RX ORDER — INSULIN ASPART 100 [IU]/ML
12 INJECTION, SUSPENSION SUBCUTANEOUS DAILY
COMMUNITY
Start: 2021-06-07

## 2021-08-25 RX ORDER — FLASH GLUCOSE SENSOR
KIT MISCELLANEOUS
COMMUNITY
Start: 2021-03-22

## 2021-08-25 RX ORDER — ONDANSETRON 4 MG/1
4 TABLET, ORALLY DISINTEGRATING ORAL EVERY 8 HOURS PRN
COMMUNITY
Start: 2021-07-19

## 2021-08-25 RX ORDER — LIRAGLUTIDE 6 MG/ML
INJECTION, SOLUTION SUBCUTANEOUS
COMMUNITY
Start: 2021-08-16 | End: 2021-12-13

## 2021-08-25 RX ORDER — MELOXICAM 15 MG/1
TABLET ORAL AS NEEDED
COMMUNITY
Start: 2021-07-21

## 2021-08-25 RX ORDER — TRIAMTERENE AND HYDROCHLOROTHIAZIDE 37.5; 25 MG/1; MG/1
1 CAPSULE ORAL DAILY
COMMUNITY
Start: 2021-08-15 | End: 2021-12-13

## 2021-08-25 RX ORDER — ESOMEPRAZOLE MAGNESIUM 40 MG/1
40 CAPSULE, DELAYED RELEASE ORAL 2 TIMES DAILY
COMMUNITY
Start: 2021-08-04

## 2021-08-25 RX ORDER — FLASH GLUCOSE SENSOR
1 KIT MISCELLANEOUS
COMMUNITY
Start: 2021-08-06

## 2021-08-25 RX ORDER — ERENUMAB-AOOE 70 MG/ML
70 INJECTION SUBCUTANEOUS
COMMUNITY
Start: 2021-08-13

## 2021-08-25 NOTE — PROGRESS NOTES
[facility] Progress Note    Subjective:   She is a known patient to me from my previous office with a history of diabetes mellitus and neuropathy.   Patient has worsening neuropathy and erratic sugar lately because of some weight loss issues and ot

## 2021-09-01 ENCOUNTER — MED REC SCAN ONLY (OUTPATIENT)
Dept: ORTHOPEDICS CLINIC | Facility: CLINIC | Age: 56
End: 2021-09-01

## 2021-09-03 DIAGNOSIS — M79.18 CERVICAL MYOFASCIAL PAIN SYNDROME: ICD-10-CM

## 2021-09-03 RX ORDER — GABAPENTIN 600 MG/1
600 TABLET ORAL 3 TIMES DAILY
Qty: 270 TABLET | Refills: 1 | Status: SHIPPED | OUTPATIENT
Start: 2021-09-03 | End: 2022-01-31

## 2021-09-03 NOTE — TELEPHONE ENCOUNTER
Medication:Gabapentin  600 mg     Date of last refill: 09/11/20 with 3 addt refills  [place]      Last office visit: 03/19/21  Due back to clinic per last office note:  3 months  Date next office visit scheduled:

## 2021-09-16 ENCOUNTER — TELEPHONE (OUTPATIENT)
Dept: NEUROLOGY | Facility: CLINIC | Age: 56
End: 2021-09-16

## 2021-09-16 DIAGNOSIS — G43.709 CHRONIC MIGRAINE W/O AURA W/O STATUS MIGRAINOSUS, NOT INTRACTABLE: Primary | ICD-10-CM

## 2021-09-16 NOTE — TELEPHONE ENCOUNTER
Prior authorization initiated through [facility] for continued use of Aimovig 140 mg injections. Case Del Valle#: SERGO WOLFE (Key: [id number]) pending insurance review.

## 2021-10-04 ENCOUNTER — OFFICE VISIT (OUTPATIENT)
Dept: UROLOGY | Facility: HOSPITAL | Age: 56
End: 2021-10-04
Attending: OBSTETRICS & GYNECOLOGY
Payer: COMMERCIAL

## 2021-10-04 VITALS — HEIGHT: 65 IN | TEMPERATURE: 98 F | WEIGHT: 190 LBS | BODY MASS INDEX: 31.65 KG/M2

## 2021-10-04 DIAGNOSIS — N81.11 CYSTOCELE, MIDLINE: Primary | ICD-10-CM

## 2021-10-04 DIAGNOSIS — N81.6 RECTOCELE: ICD-10-CM

## 2021-10-04 DIAGNOSIS — N95.2 POSTMENOPAUSAL ATROPHIC VAGINITIS: ICD-10-CM

## 2021-10-04 DIAGNOSIS — N39.3 FEMALE STRESS INCONTINENCE: ICD-10-CM

## 2021-10-04 PROCEDURE — 99212 OFFICE O/P EST SF 10 MIN: CPT

## 2021-10-04 RX ORDER — ESTRADIOL 0.1 MG/G
CREAM VAGINAL
Qty: 42.5 G | Refills: 3 | Status: SHIPPED | OUTPATIENT
Start: 2021-10-04 | End: 2021-12-20

## 2021-10-07 ENCOUNTER — OFFICE VISIT (OUTPATIENT)
Dept: UROLOGY | Facility: HOSPITAL | Age: 56
End: 2021-10-07
Attending: OBSTETRICS & GYNECOLOGY
Payer: COMMERCIAL

## 2021-10-07 VITALS — BODY MASS INDEX: 31.65 KG/M2 | HEIGHT: 65 IN | WEIGHT: 190 LBS | TEMPERATURE: 97 F

## 2021-10-07 DIAGNOSIS — N81.11 CYSTOCELE, MIDLINE: Primary | ICD-10-CM

## 2021-10-07 DIAGNOSIS — N81.6 RECTOCELE: ICD-10-CM

## 2021-10-07 DIAGNOSIS — N39.3 FEMALE STRESS INCONTINENCE: ICD-10-CM

## 2021-10-07 PROCEDURE — 81002 URINALYSIS NONAUTO W/O SCOPE: CPT

## 2021-10-07 PROCEDURE — 51784 ANAL/URINARY MUSCLE STUDY: CPT

## 2021-10-07 PROCEDURE — 51797 INTRAABDOMINAL PRESSURE TEST: CPT

## 2021-10-07 PROCEDURE — 51741 ELECTRO-UROFLOWMETRY FIRST: CPT

## 2021-10-07 PROCEDURE — 51729 CYSTOMETROGRAM W/VP&UP: CPT

## 2021-10-07 NOTE — PROCEDURES
Patient here for urodynamic testing. Procedure explained and confirmed by patient. See evaluation form for results. Both verbal and written discharge instructions were given.   Patient tolerated procedure well and will follow up with Dr. Adia Vaughn on Other  Void:   [x]  Typical  []  Atypical    Additional Notes:    CYSTOMETRY:  Urethral Catheter:  Fr 7 / tdoc  Abd Catheter:     Fr 7 / tdoc   Infusion:  Water Rate 50 mL/min  Temp:  Room  Position:  [x]  Sit  []  Stand  []  Supine  First sensation:   35

## 2021-10-19 ENCOUNTER — MED REC SCAN ONLY (OUTPATIENT)
Dept: ORTHOPEDICS CLINIC | Facility: CLINIC | Age: 56
End: 2021-10-19

## 2021-10-26 ENCOUNTER — OFFICE VISIT (OUTPATIENT)
Dept: ORTHOPEDICS CLINIC | Facility: CLINIC | Age: 56
End: 2021-10-26
Payer: COMMERCIAL

## 2021-10-26 VITALS — HEIGHT: 66 IN | WEIGHT: 188 LBS | BODY MASS INDEX: 30.22 KG/M2

## 2021-10-26 DIAGNOSIS — E11.40 TYPE 2 DIABETES MELLITUS WITH DIABETIC NEUROPATHY, WITHOUT LONG-TERM CURRENT USE OF INSULIN (HCC): ICD-10-CM

## 2021-10-26 DIAGNOSIS — Q66.70 HIGH ARCHES: Primary | ICD-10-CM

## 2021-10-26 PROCEDURE — 3008F BODY MASS INDEX DOCD: CPT | Performed by: PODIATRIST

## 2021-10-26 PROCEDURE — 99213 OFFICE O/P EST LOW 20 MIN: CPT | Performed by: PODIATRIST

## 2021-10-26 NOTE — PROGRESS NOTES
[facility] Progress Note    Subjective:   Pt is here for follow up of orthotics and PT  Using oils , muscle ease\" and sometimes capsaicin cream  Helps distract some    Got new orthotics and are feeling pretty good  Doing PT and feeling some alexia

## 2021-11-01 ENCOUNTER — OFFICE VISIT (OUTPATIENT)
Dept: UROLOGY | Facility: HOSPITAL | Age: 56
End: 2021-11-01
Attending: OBSTETRICS & GYNECOLOGY
Payer: COMMERCIAL

## 2021-11-01 VITALS — HEIGHT: 65.5 IN | BODY MASS INDEX: 30.95 KG/M2 | TEMPERATURE: 97 F | WEIGHT: 188 LBS

## 2021-11-01 DIAGNOSIS — N81.6 RECTOCELE: ICD-10-CM

## 2021-11-01 DIAGNOSIS — N39.3 FEMALE STRESS INCONTINENCE: ICD-10-CM

## 2021-11-01 DIAGNOSIS — N81.11 CYSTOCELE, MIDLINE: Primary | ICD-10-CM

## 2021-11-01 DIAGNOSIS — N95.2 POSTMENOPAUSAL ATROPHIC VAGINITIS: ICD-10-CM

## 2021-11-01 PROCEDURE — 99211 OFF/OP EST MAY X REQ PHY/QHP: CPT

## 2021-11-12 ENCOUNTER — TELEPHONE (OUTPATIENT)
Dept: ORTHOPEDICS CLINIC | Facility: CLINIC | Age: 56
End: 2021-11-12

## 2021-11-12 DIAGNOSIS — M25.572 LEFT ANKLE PAIN, UNSPECIFIED CHRONICITY: Primary | ICD-10-CM

## 2021-11-12 NOTE — TELEPHONE ENCOUNTER
Pt has an appt for rt leg pain w/Dr Rachelle Joe. No imaging on file. Please advice, thanks.   Future Appointments   Date Time Provider Vinny Sullivan   12/14/2021  1:40 PM Omar Jaime MD [facility] [id number]   1/5/2022  1:30 PM Daniel Vernon., MD [facility]   1/28/2022  8:00 AM Levi Rubio DPM [facility] [id number]   1/31/2022  9:00 AM Alexandria Garsia MD [address]

## 2021-11-12 NOTE — TELEPHONE ENCOUNTER
Spoke with patient (who saw Dr. Mercedes Kirk 2 years ago for this same complaint), who stated that she is still having pain along the anterior part of the shin, above the ankle. She has had no recent injuries to it, but wants to be reevaluated. Please advise if imaging would be needed first.     Pt also asking for recheck of left ankle. New xray orders placed for that.

## 2021-11-23 RX ORDER — ZOLMITRIPTAN 5 MG/1
SPRAY, METERED NASAL
Qty: 12 EACH | Refills: 5 | Status: SHIPPED | OUTPATIENT
Start: 2021-11-23

## 2021-11-23 NOTE — TELEPHONE ENCOUNTER
Medication: Zomig 5 mg     Date of last refill: 10/26/20 with 5 addt refills  [place]      Last office visit: 03/09/2021  Due back to clinic per last office note:  3 months  Date next office visit scheduled:

## 2021-12-01 ENCOUNTER — MED REC SCAN ONLY (OUTPATIENT)
Dept: ORTHOPEDICS CLINIC | Facility: CLINIC | Age: 56
End: 2021-12-01

## 2021-12-13 RX ORDER — DIPHENHYDRAMINE HCL 25 MG
25 TABLET ORAL NIGHTLY
COMMUNITY

## 2021-12-13 RX ORDER — ROSUVASTATIN CALCIUM 20 MG/1
20 TABLET, COATED ORAL NIGHTLY
COMMUNITY

## 2021-12-13 RX ORDER — ACETAMINOPHEN 500 MG
1000 TABLET ORAL ONCE
Status: CANCELLED | OUTPATIENT
Start: 2021-12-13 | End: 2021-12-13

## 2021-12-14 ENCOUNTER — HOSPITAL ENCOUNTER (OUTPATIENT)
Dept: GENERAL RADIOLOGY | Age: 56
Discharge: HOME | End: 2021-12-14
Attending: ORTHOPAEDIC SURGERY
Payer: COMMERCIAL

## 2021-12-14 ENCOUNTER — OFFICE VISIT (OUTPATIENT)
Dept: ORTHOPEDICS CLINIC | Facility: CLINIC | Age: 56
End: 2021-12-14
Payer: COMMERCIAL

## 2021-12-14 VITALS — HEIGHT: 66 IN | WEIGHT: 184 LBS | BODY MASS INDEX: 29.57 KG/M2

## 2021-12-14 DIAGNOSIS — M25.872 IMPINGEMENT SYNDROME OF LEFT ANKLE: ICD-10-CM

## 2021-12-14 DIAGNOSIS — M86.9: Primary | ICD-10-CM

## 2021-12-14 DIAGNOSIS — M25.572 LEFT ANKLE PAIN, UNSPECIFIED CHRONICITY: ICD-10-CM

## 2021-12-14 PROCEDURE — 99213 OFFICE O/P EST LOW 20 MIN: CPT | Performed by: ORTHOPAEDIC SURGERY

## 2021-12-14 PROCEDURE — 73610 X-RAY EXAM OF ANKLE: CPT | Performed by: ORTHOPAEDIC SURGERY

## 2021-12-14 PROCEDURE — 3008F BODY MASS INDEX DOCD: CPT | Performed by: ORTHOPAEDIC SURGERY

## 2021-12-14 RX ORDER — PEN NEEDLE, DIABETIC 32 GX 1/4"
NEEDLE, DISPOSABLE MISCELLANEOUS
COMMUNITY
Start: 2021-09-27

## 2021-12-14 RX ORDER — LIDOCAINE 4 G/G
1 PATCH TOPICAL EVERY 24 HOURS
Qty: 5 PATCH | Refills: 2 | Status: SHIPPED | OUTPATIENT
Start: 2021-12-14

## 2021-12-14 NOTE — PROGRESS NOTES
Patient is a 55-year-old white female with chronic issues and pain with her right leg and left ankle. Patient has had trauma to her right leg and left ankle over a year or 2 ago. She continues have pain of both areas.   She experiences more crepitation of

## 2021-12-17 ENCOUNTER — LAB ENCOUNTER (OUTPATIENT)
Dept: LAB | Age: 56
End: 2021-12-17
Attending: OBSTETRICS & GYNECOLOGY
Payer: COMMERCIAL

## 2021-12-17 ENCOUNTER — LABORATORY ENCOUNTER (OUTPATIENT)
Dept: LAB | Age: 56
End: 2021-12-17
Attending: OBSTETRICS & GYNECOLOGY
Payer: COMMERCIAL

## 2021-12-17 DIAGNOSIS — N81.6 CYSTOCELE WITH RECTOCELE: ICD-10-CM

## 2021-12-17 DIAGNOSIS — N81.10 CYSTOCELE WITH RECTOCELE: ICD-10-CM

## 2021-12-17 PROCEDURE — 85730 THROMBOPLASTIN TIME PARTIAL: CPT

## 2021-12-17 PROCEDURE — 85610 PROTHROMBIN TIME: CPT

## 2021-12-19 ENCOUNTER — ANESTHESIA EVENT (OUTPATIENT)
Dept: SURGERY | Facility: HOSPITAL | Age: 56
End: 2021-12-19
Payer: COMMERCIAL

## 2021-12-20 ENCOUNTER — HOSPITAL ENCOUNTER (OUTPATIENT)
Facility: HOSPITAL | Age: 56
Setting detail: HOSPITAL OUTPATIENT SURGERY
Discharge: HOME | End: 2021-12-20
Attending: OBSTETRICS & GYNECOLOGY | Admitting: OBSTETRICS & GYNECOLOGY
Payer: COMMERCIAL

## 2021-12-20 ENCOUNTER — ANESTHESIA (OUTPATIENT)
Dept: SURGERY | Facility: HOSPITAL | Age: 56
End: 2021-12-20
Payer: COMMERCIAL

## 2021-12-20 VITALS
BODY MASS INDEX: 30.62 KG/M2 | HEIGHT: 66 IN | RESPIRATION RATE: 16 BRPM | DIASTOLIC BLOOD PRESSURE: 86 MMHG | HEART RATE: 79 BPM | OXYGEN SATURATION: 97 % | TEMPERATURE: 97 F | WEIGHT: 190.5 LBS | SYSTOLIC BLOOD PRESSURE: 138 MMHG

## 2021-12-20 DIAGNOSIS — N81.6 CYSTOCELE WITH RECTOCELE: Primary | ICD-10-CM

## 2021-12-20 DIAGNOSIS — N81.10 CYSTOCELE WITH RECTOCELE: Primary | ICD-10-CM

## 2021-12-20 PROBLEM — N81.11 CYSTOCELE, MIDLINE: Status: ACTIVE | Noted: 2021-12-20

## 2021-12-20 PROBLEM — N39.3 SUI (STRESS URINARY INCONTINENCE, FEMALE): Status: ACTIVE | Noted: 2021-12-20

## 2021-12-20 PROCEDURE — 0TSD4ZZ REPOSITION URETHRA, PERCUTANEOUS ENDOSCOPIC APPROACH: ICD-10-PCS | Performed by: OBSTETRICS & GYNECOLOGY

## 2021-12-20 PROCEDURE — 0UQF0ZZ REPAIR CUL-DE-SAC, OPEN APPROACH: ICD-10-PCS | Performed by: OBSTETRICS & GYNECOLOGY

## 2021-12-20 PROCEDURE — 0JQC0ZZ REPAIR PELVIC REGION SUBCUTANEOUS TISSUE AND FASCIA, OPEN APPROACH: ICD-10-PCS | Performed by: OBSTETRICS & GYNECOLOGY

## 2021-12-20 PROCEDURE — 0TJB8ZZ INSPECTION OF BLADDER, VIA NATURAL OR ARTIFICIAL OPENING ENDOSCOPIC: ICD-10-PCS | Performed by: OBSTETRICS & GYNECOLOGY

## 2021-12-20 PROCEDURE — 82962 GLUCOSE BLOOD TEST: CPT

## 2021-12-20 PROCEDURE — 0WQNXZZ REPAIR FEMALE PERINEUM, EXTERNAL APPROACH: ICD-10-PCS | Performed by: OBSTETRICS & GYNECOLOGY

## 2021-12-20 DEVICE — TRANSVAGINAL MID-URETHRAL SLING
Type: IMPLANTABLE DEVICE | Site: BLADDER | Status: FUNCTIONAL
Brand: ADVANTAGE FIT™ BLUE SYSTEM

## 2021-12-20 RX ORDER — MIDAZOLAM HYDROCHLORIDE 1 MG/ML
INJECTION INTRAMUSCULAR; INTRAVENOUS AS NEEDED
Status: DISCONTINUED | OUTPATIENT
Start: 2021-12-20 | End: 2021-12-20 | Stop reason: SURG

## 2021-12-20 RX ORDER — SCOLOPAMINE TRANSDERMAL SYSTEM 1 MG/1
PATCH, EXTENDED RELEASE TRANSDERMAL
Status: DISCONTINUED
Start: 2021-12-20 | End: 2021-12-20

## 2021-12-20 RX ORDER — SCOLOPAMINE TRANSDERMAL SYSTEM 1 MG/1
1 PATCH, EXTENDED RELEASE TRANSDERMAL ONCE
Status: DISCONTINUED | OUTPATIENT
Start: 2021-12-20 | End: 2021-12-20

## 2021-12-20 RX ORDER — ONDANSETRON 2 MG/ML
4 INJECTION INTRAMUSCULAR; INTRAVENOUS AS NEEDED
Status: DISCONTINUED | OUTPATIENT
Start: 2021-12-20 | End: 2021-12-20

## 2021-12-20 RX ORDER — BUPIVACAINE HYDROCHLORIDE AND EPINEPHRINE 2.5; 5 MG/ML; UG/ML
INJECTION, SOLUTION EPIDURAL; INFILTRATION; INTRACAUDAL; PERINEURAL AS NEEDED
Status: DISCONTINUED | OUTPATIENT
Start: 2021-12-20 | End: 2021-12-20 | Stop reason: HOSPADM

## 2021-12-20 RX ORDER — CEFAZOLIN SODIUM/WATER 2 G/20 ML
2 SYRINGE (ML) INTRAVENOUS ONCE
Status: COMPLETED | OUTPATIENT
Start: 2021-12-20 | End: 2021-12-20

## 2021-12-20 RX ORDER — HYDROMORPHONE HYDROCHLORIDE 1 MG/ML
INJECTION, SOLUTION INTRAMUSCULAR; INTRAVENOUS; SUBCUTANEOUS
Status: COMPLETED
Start: 2021-12-20 | End: 2021-12-20

## 2021-12-20 RX ORDER — LIDOCAINE HYDROCHLORIDE 10 MG/ML
INJECTION, SOLUTION EPIDURAL; INFILTRATION; INTRACAUDAL; PERINEURAL AS NEEDED
Status: DISCONTINUED | OUTPATIENT
Start: 2021-12-20 | End: 2021-12-20 | Stop reason: SURG

## 2021-12-20 RX ORDER — KETOROLAC TROMETHAMINE 30 MG/ML
INJECTION, SOLUTION INTRAMUSCULAR; INTRAVENOUS AS NEEDED
Status: DISCONTINUED | OUTPATIENT
Start: 2021-12-20 | End: 2021-12-20 | Stop reason: SURG

## 2021-12-20 RX ORDER — ONDANSETRON 4 MG/1
4 TABLET, FILM COATED ORAL EVERY 8 HOURS PRN
Qty: 21 TABLET | Refills: 1 | Status: SHIPPED | OUTPATIENT
Start: 2021-12-20

## 2021-12-20 RX ORDER — ACETAMINOPHEN 10 MG/ML
1000 INJECTION, SOLUTION INTRAVENOUS ONCE
Status: COMPLETED | OUTPATIENT
Start: 2021-12-20 | End: 2021-12-20

## 2021-12-20 RX ORDER — ACETAMINOPHEN 10 MG/ML
INJECTION, SOLUTION INTRAVENOUS
Status: COMPLETED
Start: 2021-12-20 | End: 2021-12-20

## 2021-12-20 RX ORDER — ONDANSETRON 2 MG/ML
INJECTION INTRAMUSCULAR; INTRAVENOUS AS NEEDED
Status: DISCONTINUED | OUTPATIENT
Start: 2021-12-20 | End: 2021-12-20 | Stop reason: SURG

## 2021-12-20 RX ORDER — DEXTROSE MONOHYDRATE 25 G/50ML
50 INJECTION, SOLUTION INTRAVENOUS
Status: DISCONTINUED | OUTPATIENT
Start: 2021-12-20 | End: 2021-12-20 | Stop reason: HOSPADM

## 2021-12-20 RX ORDER — ONDANSETRON 4 MG/1
4 TABLET, FILM COATED ORAL EVERY 8 HOURS PRN
Qty: 12 TABLET | Refills: 1 | Status: SHIPPED | OUTPATIENT
Start: 2021-12-20

## 2021-12-20 RX ORDER — NALOXONE HYDROCHLORIDE 0.4 MG/ML
80 INJECTION, SOLUTION INTRAMUSCULAR; INTRAVENOUS; SUBCUTANEOUS AS NEEDED
Status: DISCONTINUED | OUTPATIENT
Start: 2021-12-20 | End: 2021-12-20

## 2021-12-20 RX ORDER — METOCLOPRAMIDE HYDROCHLORIDE 5 MG/ML
INJECTION INTRAMUSCULAR; INTRAVENOUS AS NEEDED
Status: DISCONTINUED | OUTPATIENT
Start: 2021-12-20 | End: 2021-12-20 | Stop reason: SURG

## 2021-12-20 RX ORDER — HYDROCODONE BITARTRATE AND ACETAMINOPHEN 5; 325 MG/1; MG/1
2 TABLET ORAL AS NEEDED
Status: DISCONTINUED | OUTPATIENT
Start: 2021-12-20 | End: 2021-12-20

## 2021-12-20 RX ORDER — DEXAMETHASONE SODIUM PHOSPHATE 4 MG/ML
VIAL (ML) INJECTION AS NEEDED
Status: DISCONTINUED | OUTPATIENT
Start: 2021-12-20 | End: 2021-12-20 | Stop reason: SURG

## 2021-12-20 RX ORDER — HYDROCODONE BITARTRATE AND ACETAMINOPHEN 5; 325 MG/1; MG/1
1 TABLET ORAL AS NEEDED
Status: DISCONTINUED | OUTPATIENT
Start: 2021-12-20 | End: 2021-12-20

## 2021-12-20 RX ORDER — SODIUM CHLORIDE, SODIUM LACTATE, POTASSIUM CHLORIDE, CALCIUM CHLORIDE 600; 310; 30; 20 MG/100ML; MG/100ML; MG/100ML; MG/100ML
INJECTION, SOLUTION INTRAVENOUS CONTINUOUS
Status: DISCONTINUED | OUTPATIENT
Start: 2021-12-20 | End: 2021-12-20

## 2021-12-20 RX ORDER — HYDROMORPHONE HYDROCHLORIDE 1 MG/ML
0.4 INJECTION, SOLUTION INTRAMUSCULAR; INTRAVENOUS; SUBCUTANEOUS EVERY 5 MIN PRN
Status: DISCONTINUED | OUTPATIENT
Start: 2021-12-20 | End: 2021-12-20

## 2021-12-20 RX ORDER — HYDROMORPHONE HYDROCHLORIDE 4 MG/1
4 TABLET ORAL EVERY 4 HOURS PRN
Qty: 42 TABLET | Refills: 0 | Status: SHIPPED | OUTPATIENT
Start: 2021-12-20

## 2021-12-20 RX ORDER — ACETAMINOPHEN 500 MG
1000 TABLET ORAL ONCE
COMMUNITY
End: 2021-12-20

## 2021-12-20 RX ORDER — IBUPROFEN 600 MG/1
600 TABLET ORAL EVERY 6 HOURS
Qty: 40 TABLET | Refills: 0 | Status: SHIPPED | OUTPATIENT
Start: 2021-12-20

## 2021-12-20 RX ADMIN — KETOROLAC TROMETHAMINE 30 MG: 30 INJECTION, SOLUTION INTRAMUSCULAR; INTRAVENOUS at 13:36:00

## 2021-12-20 RX ADMIN — SODIUM CHLORIDE, SODIUM LACTATE, POTASSIUM CHLORIDE, CALCIUM CHLORIDE: 600; 310; 30; 20 INJECTION, SOLUTION INTRAVENOUS at 13:36:00

## 2021-12-20 RX ADMIN — DEXAMETHASONE SODIUM PHOSPHATE 8 MG: 4 MG/ML VIAL (ML) INJECTION at 13:36:00

## 2021-12-20 RX ADMIN — ONDANSETRON 4 MG: 2 INJECTION INTRAMUSCULAR; INTRAVENOUS at 13:36:00

## 2021-12-20 RX ADMIN — MIDAZOLAM HYDROCHLORIDE 2 MG: 1 INJECTION INTRAMUSCULAR; INTRAVENOUS at 13:33:00

## 2021-12-20 RX ADMIN — SODIUM CHLORIDE, SODIUM LACTATE, POTASSIUM CHLORIDE, CALCIUM CHLORIDE: 600; 310; 30; 20 INJECTION, SOLUTION INTRAVENOUS at 13:48:00

## 2021-12-20 RX ADMIN — CEFAZOLIN SODIUM/WATER 2 G: 2 G/20 ML SYRINGE (ML) INTRAVENOUS at 13:39:00

## 2021-12-20 RX ADMIN — SODIUM CHLORIDE, SODIUM LACTATE, POTASSIUM CHLORIDE, CALCIUM CHLORIDE: 600; 310; 30; 20 INJECTION, SOLUTION INTRAVENOUS at 14:04:00

## 2021-12-20 RX ADMIN — METOCLOPRAMIDE HYDROCHLORIDE 10 MG: 5 INJECTION INTRAMUSCULAR; INTRAVENOUS at 13:36:00

## 2021-12-20 RX ADMIN — LIDOCAINE HYDROCHLORIDE 50 MG: 10 INJECTION, SOLUTION EPIDURAL; INFILTRATION; INTRACAUDAL; PERINEURAL at 13:36:00

## 2021-12-20 NOTE — OPERATIVE REPORT
PRE-OP DIAGNOSIS:  Cystocele; rectocele; stress incontinence    POST-OP DIAGNOSIS:  Same with enterocele     PROCEDURE:  Anterior colporrhaphy; Repair of enterocele;  Posterior colporrhaphy;   Mid-urethral sling; Cystourethroscopy    SURGEON:  Leverne Number, cystoscopy was performed confirming there had been no injury to the bladder with placement of the trocars and sleeves. In addition, urine was seen from both ureteral orifices, confirming that the ureters were patent.   The sleeves were then brought through

## 2021-12-20 NOTE — ANESTHESIA PROCEDURE NOTES
Airway  Date/Time: 12/20/2021 1:38 PM  Urgency: Elective    Airway not difficult    General Information and Staff    Patient location during procedure: OR  Anesthesiologist: Collin Weber MD  Resident/CRNA: Krissy Rai CRNA  Performed: CRNA     Ind

## 2021-12-20 NOTE — ANESTHESIA POSTPROCEDURE EVALUATION
[address] Patient Status:  Hospital Outpatient Surgery   Age/Gender 54year old female MRN [id number]   [facility] SURGERY Attending Cassie Montes MD   Hosp Day # 0 PCP Cate Young Pos

## 2021-12-20 NOTE — ANESTHESIA PREPROCEDURE EVALUATION
PRE-OP EVALUATION    Patient Name: Oskar Boone    Admit Diagnosis: stress incontinence    Pre-op Diagnosis: stress incontinence    ANTERIOR POSTERIOR REPAIR,POSSIBLE PLACEMENT OF MIDURETHRAL SLING, CYSTOSCOPY    Anesthesia Procedure: ANTERIOR POSTER 0945  rosuvastatin 20 MG Oral Tab, Take 20 mg by mouth nightly., Disp: , Rfl:   diphenhydrAMINE HCl 25 MG Oral Tab, Take 25 mg by mouth nightly., Disp: , Rfl:   estradiol (ESTRACE) 0.1 MG/GM Vaginal Cream, Apply 1 gram vaginally 3 times per week., Disp: 42 READER) Does not apply Device, , Disp: , Rfl:   Continuous Blood Gluc Sensor (FREESTYLE SANDY 2 SENSOR) Does not apply Misc, , Disp: , Rfl:   Continuous Blood Gluc Sensor (FREESTYLE SANDY 2 SENSOR) Does not apply Misc, 1 Device by Does not apply route ever •      • CHOLECYSTECTOMY     • D & C     • ENDOMETRIAL BIOPSY - JAR(S): 2     • FOOT SURGERY  2019    left   • FOOT SURGERY  2019    left   • HYSTERECTOMY  2006    MARIVEL BSO   • LAPAROSCOPY PROCEDURE UNLISTED     • GAMA

## 2021-12-20 NOTE — H&P
[address] [facility] Patient Status:  Hospital Outpatient Surgery    1965 MRN [id number]   Location [facility] PRE OP HOLDING Attending Alexandra Gonzalez MD   Hosp Day # 0 PCP Saman Buitrago (anticardiolipin antibodies) Sister    • High Blood Pressure Brother    • High Cholesterol Brother    • Diabetes Brother    • Other (distonia) Brother    • Other (MS) Brother    • Arthritis Sister    • Other (anticardiolipin antibodies) Sister    • Other ( 200 MG Oral Tab, Take 200 mg by mouth daily. , Disp: , Rfl:   Erenumab-aooe (AIMOVIG) 140 MG/ML Subcutaneous Solution Auto-injector, Inject 1 mL (140 mg total) into the skin every 30 (thirty) days. , Disp: 1 mL, Rfl: 11  furosemide 20 MG Oral Tab, Take 20 mg Glucose Blood (CONTOUR NEXT TEST) In Vitro Strip, by Other route.  , Disp: , Rfl:   Meclizine HCl 25 MG Oral Tab, Take 25 mg by mouth 3 (three) times daily as needed. , Disp: , Rfl:   Melatonin 5 MG Oral Tab, Take 5 mg by mouth as needed. , Disp: , Rfl: Again discussed rationale for post-operative Marr.       Total time spent with patient:  15 minutes    Dada Almonte MD  12/20/2021  1:24 PM

## 2021-12-23 ENCOUNTER — TELEPHONE (OUTPATIENT)
Dept: UROLOGY | Facility: HOSPITAL | Age: 56
End: 2021-12-23

## 2021-12-23 NOTE — TELEPHONE ENCOUNTER
Patient called had APR,MUS and CYSTO 12/20/2021 has not had a bowel movement. Patient currently using miralax once a night. Plan to start MOM.  Patient having some gastric reflux thinking may be from ibuprofen stopped ibuprofen still using hydomorphine and

## 2021-12-27 ENCOUNTER — OFFICE VISIT (OUTPATIENT)
Dept: UROLOGY | Facility: HOSPITAL | Age: 56
End: 2021-12-27
Attending: OBSTETRICS & GYNECOLOGY
Payer: COMMERCIAL

## 2021-12-27 ENCOUNTER — TELEPHONE (OUTPATIENT)
Dept: UROLOGY | Facility: HOSPITAL | Age: 56
End: 2021-12-27

## 2021-12-27 VITALS — HEIGHT: 66 IN | WEIGHT: 190 LBS | BODY MASS INDEX: 30.53 KG/M2 | TEMPERATURE: 97 F

## 2021-12-27 DIAGNOSIS — N99.89 POSTOPERATIVE URINARY RETENTION: Primary | ICD-10-CM

## 2021-12-27 DIAGNOSIS — R33.8 POSTOPERATIVE URINARY RETENTION: Primary | ICD-10-CM

## 2021-12-27 PROCEDURE — 99212 OFFICE O/P EST SF 10 MIN: CPT

## 2021-12-27 NOTE — PROCEDURES
Patient here for voiding trial.   Procedure Date: 12/20/2021  Procedure Name: Anterior/Posterior/Enterocele Repair, Cystoscopy and Mid-urethral Sling  Doing well no complaints  BMs regular  Using Benefiber and Miralax for bowel mgmt  Pain  Using Ibuprofen

## 2021-12-27 NOTE — PATIENT INSTRUCTIONS
Voiding Trial Instructions  You have passed your voiding trial at 0845. Please make sure you are drinking some water today. You can take your Motrin to help with any swelling from the catheter.   It is important to try and empty your bladder every two harmeet

## 2021-12-27 NOTE — TELEPHONE ENCOUNTER
TC placed to pt to check in with voiding s/p VT this am and to get an update on the urinary symptoms she c/o this morning. Pt states she is voiding well and feels she is emptying her bladder well.  She states that she has no burning with urination or any ot

## 2022-01-28 ENCOUNTER — OFFICE VISIT (OUTPATIENT)
Dept: ORTHOPEDICS CLINIC | Facility: CLINIC | Age: 57
End: 2022-01-28
Payer: COMMERCIAL

## 2022-01-28 DIAGNOSIS — R26.89 BALANCE DISORDER: ICD-10-CM

## 2022-01-28 DIAGNOSIS — M79.672 FOOT PAIN, BILATERAL: ICD-10-CM

## 2022-01-28 DIAGNOSIS — M79.671 FOOT PAIN, BILATERAL: ICD-10-CM

## 2022-01-28 DIAGNOSIS — E11.40 TYPE 2 DIABETES MELLITUS WITH DIABETIC NEUROPATHY, WITHOUT LONG-TERM CURRENT USE OF INSULIN (HCC): ICD-10-CM

## 2022-01-28 DIAGNOSIS — Q66.70 HIGH ARCHES: Primary | ICD-10-CM

## 2022-01-28 DIAGNOSIS — G62.9 NEUROPATHY: ICD-10-CM

## 2022-01-28 DIAGNOSIS — R26.9 GAIT ABNORMALITY: ICD-10-CM

## 2022-01-28 DIAGNOSIS — L90.9 FAT PAD ATROPHY OF FOOT: ICD-10-CM

## 2022-01-28 PROCEDURE — 99213 OFFICE O/P EST LOW 20 MIN: CPT | Performed by: PODIATRIST

## 2022-01-28 RX ORDER — DULAGLUTIDE 4.5 MG/.5ML
INJECTION, SOLUTION SUBCUTANEOUS
COMMUNITY
Start: 2022-01-20

## 2022-01-28 RX ORDER — ESTRADIOL 0.1 MG/G
1 CREAM VAGINAL
COMMUNITY
Start: 2022-01-14

## 2022-01-28 NOTE — PROGRESS NOTES
[facility] Progress Note    Subjective:     Jamal Harding is here for her biannual diabetic foot check. She has a history of neuropathy and diabetes.   She also has been in physical therapy at [facility] with Clifford Opitz, the anodyne treatment has been very helpful

## 2022-01-30 DIAGNOSIS — M79.18 CERVICAL MYOFASCIAL PAIN SYNDROME: ICD-10-CM

## 2022-01-31 ENCOUNTER — OFFICE VISIT (OUTPATIENT)
Dept: UROLOGY | Facility: CLINIC | Age: 57
End: 2022-01-31
Attending: OBSTETRICS & GYNECOLOGY
Payer: COMMERCIAL

## 2022-01-31 VITALS — TEMPERATURE: 97 F | HEIGHT: 66 IN | BODY MASS INDEX: 30.53 KG/M2 | WEIGHT: 190 LBS

## 2022-01-31 DIAGNOSIS — Z98.890 POSTOPERATIVE STATE: Primary | ICD-10-CM

## 2022-01-31 PROCEDURE — 99212 OFFICE O/P EST SF 10 MIN: CPT

## 2022-01-31 RX ORDER — GABAPENTIN 600 MG/1
TABLET ORAL
Qty: 270 TABLET | Refills: 0 | Status: SHIPPED | OUTPATIENT
Start: 2022-01-31

## 2022-01-31 NOTE — TELEPHONE ENCOUNTER
Medication:Gabapentin  600 mg     Date of last refill: 09/03/21 with 1 addt refill  [place]      Last office visit: 03/19/21  Due back to clinic per last office note:  3 months  Date next office visit scheduled:

## 2022-02-01 ENCOUNTER — TELEPHONE (OUTPATIENT)
Dept: NEUROLOGY | Facility: CLINIC | Age: 57
End: 2022-02-01

## 2022-02-01 NOTE — TELEPHONE ENCOUNTER
PA requested for Zomig 5mg nasal spray  Clinical questions answered and submitted to insurance  Awaiting coverage determination

## 2022-02-02 NOTE — TELEPHONE ENCOUNTER
Fax received from [employer]. Approval received for use of Zomig 5mg nasal spray. Approval for up to 12 units per month.     Approval granted: 02/01/2022 - 2/1/2023

## 2022-02-14 RX ORDER — ESTRADIOL 0.1 MG/G
CREAM VAGINAL
Qty: 42.5 G | Refills: 3 | Status: SHIPPED | OUTPATIENT
Start: 2022-02-14

## 2022-02-23 RX ORDER — ERENUMAB-AOOE 140 MG/ML
140 INJECTION, SOLUTION SUBCUTANEOUS
Qty: 1 ML | Refills: 0 | Status: SHIPPED | OUTPATIENT
Start: 2022-02-23 | End: 2022-03-18

## 2022-03-09 ENCOUNTER — OFFICE VISIT (OUTPATIENT)
Dept: SURGERY | Facility: CLINIC | Age: 57
End: 2022-03-09
Payer: COMMERCIAL

## 2022-03-09 DIAGNOSIS — E65 ABDOMINAL PANNUS: Primary | ICD-10-CM

## 2022-03-09 PROCEDURE — 99213 OFFICE O/P EST LOW 20 MIN: CPT | Performed by: SURGERY

## 2022-03-18 ENCOUNTER — OFFICE VISIT (OUTPATIENT)
Dept: NEUROLOGY | Facility: CLINIC | Age: 57
End: 2022-03-18
Payer: COMMERCIAL

## 2022-03-18 VITALS
HEART RATE: 86 BPM | HEIGHT: 66 IN | SYSTOLIC BLOOD PRESSURE: 127 MMHG | DIASTOLIC BLOOD PRESSURE: 73 MMHG | WEIGHT: 190 LBS | RESPIRATION RATE: 16 BRPM | BODY MASS INDEX: 30.53 KG/M2

## 2022-03-18 DIAGNOSIS — G43.709 CHRONIC MIGRAINE W/O AURA W/O STATUS MIGRAINOSUS, NOT INTRACTABLE: Primary | ICD-10-CM

## 2022-03-18 DIAGNOSIS — M79.18 CERVICAL MYOFASCIAL PAIN SYNDROME: ICD-10-CM

## 2022-03-18 PROCEDURE — 3078F DIAST BP <80 MM HG: CPT | Performed by: OTHER

## 2022-03-18 PROCEDURE — 3008F BODY MASS INDEX DOCD: CPT | Performed by: OTHER

## 2022-03-18 PROCEDURE — 99213 OFFICE O/P EST LOW 20 MIN: CPT | Performed by: OTHER

## 2022-03-18 PROCEDURE — 3074F SYST BP LT 130 MM HG: CPT | Performed by: OTHER

## 2022-03-18 RX ORDER — ERENUMAB-AOOE 140 MG/ML
140 INJECTION, SOLUTION SUBCUTANEOUS
Qty: 3 EACH | Refills: 3 | Status: SHIPPED | OUTPATIENT
Start: 2022-03-18 | End: 2023-03-18

## 2022-03-18 RX ORDER — MELATONIN 5 MG
12 TABLET,CHEWABLE ORAL NIGHTLY
COMMUNITY

## 2022-03-18 RX ORDER — ZOLMITRIPTAN 5 MG/1
1 SPRAY NASAL AS NEEDED
Qty: 12 EACH | Refills: 5 | Status: SHIPPED | OUTPATIENT
Start: 2022-03-18

## 2022-03-25 NOTE — TELEPHONE ENCOUNTER
Rx refilled 3/18/22, #3/3R to requesting pharmacy. Verified with pharmacy Rx was received.     RN, please refuse as this is a duplicate request

## 2022-03-30 RX ORDER — ERENUMAB-AOOE 140 MG/ML
INJECTION, SOLUTION SUBCUTANEOUS
Qty: 1 ML | Refills: 0 | OUTPATIENT
Start: 2022-03-30

## 2022-05-03 RX ORDER — GABAPENTIN 600 MG/1
TABLET ORAL
Qty: 270 TABLET | Refills: 1 | Status: SHIPPED | OUTPATIENT
Start: 2022-05-03

## 2022-08-05 ENCOUNTER — OFFICE VISIT (OUTPATIENT)
Dept: UROLOGY | Facility: CLINIC | Age: 57
End: 2022-08-05
Attending: OBSTETRICS & GYNECOLOGY
Payer: COMMERCIAL

## 2022-08-05 VITALS — WEIGHT: 190 LBS | HEIGHT: 66 IN | BODY MASS INDEX: 30.53 KG/M2 | TEMPERATURE: 98 F

## 2022-08-05 DIAGNOSIS — N95.2 POSTMENOPAUSAL ATROPHIC VAGINITIS: Primary | ICD-10-CM

## 2022-08-05 PROCEDURE — 99212 OFFICE O/P EST SF 10 MIN: CPT

## 2022-08-12 ENCOUNTER — OFFICE VISIT (OUTPATIENT)
Dept: ORTHOPEDICS CLINIC | Facility: CLINIC | Age: 57
End: 2022-08-12
Payer: COMMERCIAL

## 2022-08-12 DIAGNOSIS — E11.40 TYPE 2 DIABETES MELLITUS WITH DIABETIC NEUROPATHY, WITHOUT LONG-TERM CURRENT USE OF INSULIN (HCC): ICD-10-CM

## 2022-08-12 DIAGNOSIS — R26.9 GAIT ABNORMALITY: ICD-10-CM

## 2022-08-12 DIAGNOSIS — L90.9 FAT PAD ATROPHY OF FOOT: ICD-10-CM

## 2022-08-12 DIAGNOSIS — R26.89 BALANCE DISORDER: ICD-10-CM

## 2022-08-12 DIAGNOSIS — Q66.70 HIGH ARCHES: Primary | ICD-10-CM

## 2022-08-12 DIAGNOSIS — G62.9 NEUROPATHY: ICD-10-CM

## 2022-08-12 PROCEDURE — 99213 OFFICE O/P EST LOW 20 MIN: CPT | Performed by: PODIATRIST

## 2022-08-12 RX ORDER — NALTREXONE HYDROCHLORIDE 50 MG/1
TABLET, FILM COATED ORAL
COMMUNITY
Start: 2022-03-03

## 2022-08-12 RX ORDER — BUPROPION HYDROCHLORIDE 150 MG/1
TABLET ORAL
COMMUNITY
Start: 2022-03-03

## 2022-08-12 RX ORDER — AZELASTINE HYDROCHLORIDE 137 UG/1
SPRAY, METERED NASAL
COMMUNITY
Start: 2022-03-03

## 2022-08-12 NOTE — PROGRESS NOTES
[facility] Progress Note    Subjective:     González Sr is here for yearly checkup of her feet as a diabetic with neuropathy. She has had her orthotics for 1 year and is interested in another pair because they are sending out  She is doing pretty well and keeps up with Russ in physical therapy now and then. She has her own TENS unit        Objective:     Exam high arch foot with thinning fat pad across the metatarsal heads. Sensation is diminished sharp versus dull. Tingling of the toes. Pedal pulses are palpable. No swelling of the feet today. Assessment/Plan:     Diagnoses and all orders for this visit:    High arches  -     DME - EXTERNAL     Type 2 diabetes mellitus with diabetic neuropathy, without long-term current use of insulin (HCC)  -     DME - EXTERNAL     Neuropathy  -     DME - EXTERNAL     Gait abnormality  -     DME - EXTERNAL     Balance disorder  -     DME - EXTERNAL     Fat pad atrophy of foot  -     DME - EXTERNAL         Renew her orthotics, we will order the same from [facility] and they can send them to her home. She is doing well. continue with her same regimen of exercise and protection of the feet. Follow-up in 1 year            Hannah Plascencia. Viridiana Arita DPM  [facility]    [employer] speech recognition software was used to prepare this note. If a word or phrase is confusing, it is likely do to a failure of recognition. Please contact me with any questions or clarifications.

## 2022-08-22 NOTE — TELEPHONE ENCOUNTER
Patient having too many side effects with Divalproex. She dropped off pharmacy printout highlighting all of the following: weight gain, hair loss, excessive sleepiness, tremors, muscle and ear pain, and hoarseness.   She would like an alternative med, call Albendazole Counseling:  I discussed with the patient the risks of albendazole including but not limited to cytopenia, kidney damage, nausea/vomiting and severe allergy.  The patient understands that this medication is being used in an off-label manner.

## 2022-09-08 ENCOUNTER — TELEPHONE (OUTPATIENT)
Dept: NEUROLOGY | Facility: CLINIC | Age: 57
End: 2022-09-08

## 2022-09-08 NOTE — TELEPHONE ENCOUNTER
PA requested for Aimovig  Clinical questions answered and submitted to insurance  Awaiting coverage determination

## 2022-10-23 DIAGNOSIS — M79.18 CERVICAL MYOFASCIAL PAIN SYNDROME: ICD-10-CM

## 2022-10-24 RX ORDER — GABAPENTIN 600 MG/1
TABLET ORAL
Qty: 270 TABLET | Refills: 0 | Status: SHIPPED | OUTPATIENT
Start: 2022-10-24

## 2022-10-24 RX ORDER — ZOLMITRIPTAN 5 MG/1
1 SPRAY NASAL AS NEEDED
Qty: 12 EACH | Refills: 2 | Status: SHIPPED | OUTPATIENT
Start: 2022-10-24

## 2022-12-30 ENCOUNTER — OFFICE VISIT (OUTPATIENT)
Dept: UROLOGY | Facility: CLINIC | Age: 57
End: 2022-12-30
Attending: OBSTETRICS & GYNECOLOGY
Payer: COMMERCIAL

## 2022-12-30 VITALS — BODY MASS INDEX: 30.53 KG/M2 | WEIGHT: 190 LBS | TEMPERATURE: 96 F | HEIGHT: 66 IN

## 2022-12-30 DIAGNOSIS — N95.2 POSTMENOPAUSAL ATROPHIC VAGINITIS: Primary | ICD-10-CM

## 2022-12-30 DIAGNOSIS — R10.2 PELVIC PAIN: ICD-10-CM

## 2022-12-30 DIAGNOSIS — Z98.890 POSTOPERATIVE STATE: ICD-10-CM

## 2022-12-30 PROCEDURE — 99212 OFFICE O/P EST SF 10 MIN: CPT

## 2023-01-26 DIAGNOSIS — M79.18 CERVICAL MYOFASCIAL PAIN SYNDROME: ICD-10-CM

## 2023-01-27 RX ORDER — GABAPENTIN 600 MG/1
TABLET ORAL
Qty: 270 TABLET | Refills: 0 | Status: SHIPPED | OUTPATIENT
Start: 2023-01-27

## 2023-02-01 DIAGNOSIS — M79.18 CERVICAL MYOFASCIAL PAIN SYNDROME: ICD-10-CM

## 2023-02-01 RX ORDER — GABAPENTIN 600 MG/1
TABLET ORAL
Qty: 270 TABLET | Refills: 0 | OUTPATIENT
Start: 2023-02-01

## 2023-02-23 RX ORDER — BECLOMETHASONE DIPROPIONATE HFA 40 UG/1
2 AEROSOL, METERED RESPIRATORY (INHALATION) EVERY 12 HOURS
COMMUNITY
Start: 2022-11-03

## 2023-02-23 RX ORDER — NALTREXONE HYDROCHLORIDE AND BUPROPION HYDROCHLORIDE 8; 90 MG/1; MG/1
2 TABLET, EXTENDED RELEASE ORAL 2 TIMES DAILY
COMMUNITY

## 2023-03-03 ENCOUNTER — LAB ENCOUNTER (OUTPATIENT)
Dept: LAB | Age: 58
End: 2023-03-03
Attending: OBSTETRICS & GYNECOLOGY
Payer: COMMERCIAL

## 2023-03-03 DIAGNOSIS — Z01.818 PRE-OP TESTING: ICD-10-CM

## 2023-03-04 LAB — SARS-COV-2 RNA RESP QL NAA+PROBE: NOT DETECTED

## 2023-03-06 ENCOUNTER — ANESTHESIA (OUTPATIENT)
Dept: SURGERY | Facility: HOSPITAL | Age: 58
End: 2023-03-06
Payer: COMMERCIAL

## 2023-03-06 ENCOUNTER — ANESTHESIA EVENT (OUTPATIENT)
Dept: SURGERY | Facility: HOSPITAL | Age: 58
End: 2023-03-06
Payer: COMMERCIAL

## 2023-03-06 ENCOUNTER — HOSPITAL ENCOUNTER (OUTPATIENT)
Facility: HOSPITAL | Age: 58
Setting detail: HOSPITAL OUTPATIENT SURGERY
Discharge: HOME | End: 2023-03-06
Attending: OBSTETRICS & GYNECOLOGY | Admitting: OBSTETRICS & GYNECOLOGY
Payer: COMMERCIAL

## 2023-03-06 VITALS
HEART RATE: 81 BPM | OXYGEN SATURATION: 98 % | RESPIRATION RATE: 18 BRPM | SYSTOLIC BLOOD PRESSURE: 139 MMHG | BODY MASS INDEX: 30.57 KG/M2 | TEMPERATURE: 98 F | HEIGHT: 66 IN | WEIGHT: 190.19 LBS | DIASTOLIC BLOOD PRESSURE: 84 MMHG

## 2023-03-06 DIAGNOSIS — Z01.818 PRE-OP TESTING: Primary | ICD-10-CM

## 2023-03-06 LAB
GLUCOSE BLD-MCNC: 116 MG/DL (ref 70–99)
GLUCOSE BLD-MCNC: 92 MG/DL (ref 70–99)

## 2023-03-06 PROCEDURE — 0WBNXZZ EXCISION OF FEMALE PERINEUM, EXTERNAL APPROACH: ICD-10-PCS | Performed by: OBSTETRICS & GYNECOLOGY

## 2023-03-06 PROCEDURE — 82962 GLUCOSE BLOOD TEST: CPT

## 2023-03-06 PROCEDURE — 0TJB8ZZ INSPECTION OF BLADDER, VIA NATURAL OR ARTIFICIAL OPENING ENDOSCOPIC: ICD-10-PCS | Performed by: OBSTETRICS & GYNECOLOGY

## 2023-03-06 PROCEDURE — 0UBGXZZ EXCISION OF VAGINA, EXTERNAL APPROACH: ICD-10-PCS | Performed by: OBSTETRICS & GYNECOLOGY

## 2023-03-06 PROCEDURE — 88305 TISSUE EXAM BY PATHOLOGIST: CPT | Performed by: OBSTETRICS & GYNECOLOGY

## 2023-03-06 RX ORDER — ACETAMINOPHEN 500 MG
1000 TABLET ORAL ONCE
Status: DISCONTINUED | OUTPATIENT
Start: 2023-03-06 | End: 2023-03-06 | Stop reason: HOSPADM

## 2023-03-06 RX ORDER — SODIUM CHLORIDE, SODIUM LACTATE, POTASSIUM CHLORIDE, CALCIUM CHLORIDE 600; 310; 30; 20 MG/100ML; MG/100ML; MG/100ML; MG/100ML
INJECTION, SOLUTION INTRAVENOUS CONTINUOUS
Status: DISCONTINUED | OUTPATIENT
Start: 2023-03-06 | End: 2023-03-06

## 2023-03-06 RX ORDER — HYDROMORPHONE HYDROCHLORIDE 1 MG/ML
0.4 INJECTION, SOLUTION INTRAMUSCULAR; INTRAVENOUS; SUBCUTANEOUS EVERY 5 MIN PRN
Status: DISCONTINUED | OUTPATIENT
Start: 2023-03-06 | End: 2023-03-06

## 2023-03-06 RX ORDER — HYDROMORPHONE HYDROCHLORIDE 1 MG/ML
0.2 INJECTION, SOLUTION INTRAMUSCULAR; INTRAVENOUS; SUBCUTANEOUS EVERY 5 MIN PRN
Status: DISCONTINUED | OUTPATIENT
Start: 2023-03-06 | End: 2023-03-06

## 2023-03-06 RX ORDER — DIPHENHYDRAMINE HYDROCHLORIDE 50 MG/ML
12.5 INJECTION INTRAMUSCULAR; INTRAVENOUS AS NEEDED
Status: DISCONTINUED | OUTPATIENT
Start: 2023-03-06 | End: 2023-03-06

## 2023-03-06 RX ORDER — INSULIN ASPART 100 [IU]/ML
INJECTION, SOLUTION INTRAVENOUS; SUBCUTANEOUS ONCE
Status: DISCONTINUED | OUTPATIENT
Start: 2023-03-06 | End: 2023-03-06

## 2023-03-06 RX ORDER — NALOXONE HYDROCHLORIDE 0.4 MG/ML
80 INJECTION, SOLUTION INTRAMUSCULAR; INTRAVENOUS; SUBCUTANEOUS AS NEEDED
Status: DISCONTINUED | OUTPATIENT
Start: 2023-03-06 | End: 2023-03-06

## 2023-03-06 RX ORDER — KETOROLAC TROMETHAMINE 30 MG/ML
30 INJECTION, SOLUTION INTRAMUSCULAR; INTRAVENOUS ONCE AS NEEDED
Status: COMPLETED | OUTPATIENT
Start: 2023-03-06 | End: 2023-03-06

## 2023-03-06 RX ORDER — NICOTINE POLACRILEX 4 MG
15 LOZENGE BUCCAL
Status: DISCONTINUED | OUTPATIENT
Start: 2023-03-06 | End: 2023-03-06 | Stop reason: HOSPADM

## 2023-03-06 RX ORDER — ZOLMITRIPTAN 5 MG/1
SPRAY NASAL
Qty: 12 EACH | Refills: 0 | Status: SHIPPED | OUTPATIENT
Start: 2023-03-06

## 2023-03-06 RX ORDER — CEFAZOLIN SODIUM/WATER 2 G/20 ML
2 SYRINGE (ML) INTRAVENOUS ONCE
Status: COMPLETED | OUTPATIENT
Start: 2023-03-06 | End: 2023-03-06

## 2023-03-06 RX ORDER — NICOTINE POLACRILEX 4 MG
30 LOZENGE BUCCAL
Status: DISCONTINUED | OUTPATIENT
Start: 2023-03-06 | End: 2023-03-06 | Stop reason: HOSPADM

## 2023-03-06 RX ORDER — IBUPROFEN 600 MG/1
600 TABLET ORAL EVERY 6 HOURS
Qty: 40 TABLET | Refills: 0 | Status: SHIPPED | OUTPATIENT
Start: 2023-03-06

## 2023-03-06 RX ORDER — DEXTROSE MONOHYDRATE 25 G/50ML
50 INJECTION, SOLUTION INTRAVENOUS
Status: DISCONTINUED | OUTPATIENT
Start: 2023-03-06 | End: 2023-03-06 | Stop reason: HOSPADM

## 2023-03-06 RX ORDER — LIDOCAINE HYDROCHLORIDE 10 MG/ML
INJECTION, SOLUTION EPIDURAL; INFILTRATION; INTRACAUDAL; PERINEURAL AS NEEDED
Status: DISCONTINUED | OUTPATIENT
Start: 2023-03-06 | End: 2023-03-06 | Stop reason: SURG

## 2023-03-06 RX ORDER — HYDROMORPHONE HYDROCHLORIDE 1 MG/ML
0.6 INJECTION, SOLUTION INTRAMUSCULAR; INTRAVENOUS; SUBCUTANEOUS EVERY 5 MIN PRN
Status: DISCONTINUED | OUTPATIENT
Start: 2023-03-06 | End: 2023-03-06

## 2023-03-06 RX ORDER — PHENYLEPHRINE HCL 10 MG/ML
VIAL (ML) INJECTION AS NEEDED
Status: DISCONTINUED | OUTPATIENT
Start: 2023-03-06 | End: 2023-03-06 | Stop reason: SURG

## 2023-03-06 RX ORDER — MIDAZOLAM HYDROCHLORIDE 1 MG/ML
INJECTION INTRAMUSCULAR; INTRAVENOUS AS NEEDED
Status: DISCONTINUED | OUTPATIENT
Start: 2023-03-06 | End: 2023-03-06 | Stop reason: SURG

## 2023-03-06 RX ORDER — BUPIVACAINE HYDROCHLORIDE AND EPINEPHRINE 2.5; 5 MG/ML; UG/ML
INJECTION, SOLUTION EPIDURAL; INFILTRATION; INTRACAUDAL; PERINEURAL AS NEEDED
Status: DISCONTINUED | OUTPATIENT
Start: 2023-03-06 | End: 2023-03-06 | Stop reason: HOSPADM

## 2023-03-06 RX ORDER — SCOLOPAMINE TRANSDERMAL SYSTEM 1 MG/1
1 PATCH, EXTENDED RELEASE TRANSDERMAL ONCE
Status: DISCONTINUED | OUTPATIENT
Start: 2023-03-06 | End: 2023-03-06

## 2023-03-06 RX ORDER — ONDANSETRON 2 MG/ML
INJECTION INTRAMUSCULAR; INTRAVENOUS AS NEEDED
Status: DISCONTINUED | OUTPATIENT
Start: 2023-03-06 | End: 2023-03-06 | Stop reason: SURG

## 2023-03-06 RX ORDER — DEXAMETHASONE SODIUM PHOSPHATE 4 MG/ML
VIAL (ML) INJECTION AS NEEDED
Status: DISCONTINUED | OUTPATIENT
Start: 2023-03-06 | End: 2023-03-06 | Stop reason: SURG

## 2023-03-06 RX ADMIN — SODIUM CHLORIDE, SODIUM LACTATE, POTASSIUM CHLORIDE, CALCIUM CHLORIDE: 600; 310; 30; 20 INJECTION, SOLUTION INTRAVENOUS at 15:27:00

## 2023-03-06 RX ADMIN — CEFAZOLIN SODIUM/WATER 2 G: 2 G/20 ML SYRINGE (ML) INTRAVENOUS at 15:40:00

## 2023-03-06 RX ADMIN — SODIUM CHLORIDE, SODIUM LACTATE, POTASSIUM CHLORIDE, CALCIUM CHLORIDE: 600; 310; 30; 20 INJECTION, SOLUTION INTRAVENOUS at 16:03:00

## 2023-03-06 RX ADMIN — LIDOCAINE HYDROCHLORIDE 5 ML: 10 INJECTION, SOLUTION EPIDURAL; INFILTRATION; INTRACAUDAL; PERINEURAL at 15:33:00

## 2023-03-06 RX ADMIN — DEXAMETHASONE SODIUM PHOSPHATE 8 MG: 4 MG/ML VIAL (ML) INJECTION at 15:36:00

## 2023-03-06 RX ADMIN — PHENYLEPHRINE HCL 100 MCG: 10 MG/ML VIAL (ML) INJECTION at 15:57:00

## 2023-03-06 RX ADMIN — ONDANSETRON 4 MG: 2 INJECTION INTRAMUSCULAR; INTRAVENOUS at 15:42:00

## 2023-03-06 RX ADMIN — MIDAZOLAM HYDROCHLORIDE 2 MG: 1 INJECTION INTRAMUSCULAR; INTRAVENOUS at 15:30:00

## 2023-03-06 NOTE — OPERATIVE REPORT
Pre-Operative Diagnosis:  Pelvic pain; Vaginal apex scar; perineal scar    Post-Operative Diagnosis: Same. Procedure Performed:  Examination under anesthesia, excision of vaginal apex scar; excision of perineal scar; cystourethroscopy     Surgeon:  Stanley Harris MD    Assistant:  Braulio Machado DO    Anesthesia: General    EBL:  5 ml    Complications: None    Specimen:  Vaginal apex scar; perineal scar     Due to the nature and complexity of this case, it was necessary to have Dr. Elma Valadez skilled assistance. The patient was taken to the operating room, induced under general anesthesia, then placed in the dorsal lithotomy position and prepped and draped in the usual sterile fashion. The [place] vaginal Bookwalter retractor was utilized and retraction was placed in the vagina. Examination confirmed a band of dense scar tissue at the vaginal apex. Local anesthetic was infiltrated and an incision was made around the area of scar tissue. This area was approximately 3 x 4 cm. Sharp dissection with Metzenbaum scissors was performed to dissect the scar and vaginal epithelium from the underlying connective tissue and the scar was removed. 2-0 Vicryl was then used to re-approximate the vaginal apex epithelium in a running, locking fashion. Inspection at this point revealed excellent hemostasis. Local anesthetic was then injected into the perineal scar. The scar was excised and the defect was closed in a transverse fashion. Cystourethroscopy was then performed. No bladder lesions were noted. Urine was seen from both ureteral orifices. The patient was then removed from the dorsal lithotomy position, awakened and transferred from the operating room to the recovery room in stable condition, having tolerated the procedure well.

## 2023-03-06 NOTE — ANESTHESIA PROCEDURE NOTES
Airway  Date/Time: 3/6/2023 3:34 PM  Urgency: elective      General Information and Staff    Patient location during procedure: OR  Anesthesiologist: Yobani Serrano MD  Performed: anesthesiologist   Performed by: Yobani Serrano MD  Authorized by: Yobani Serrano MD      Indications and Patient Condition  Indications for airway management: anesthesia  Spontaneous Ventilation: absent  Sedation level: deep  Preoxygenated: yes  Patient position: sniffing  Mask difficulty assessment: 0 - not attempted    Final Airway Details  Final airway type: supraglottic airway      Successful airway: classic  Size 3       Number of attempts at approach: 1

## 2023-03-06 NOTE — DISCHARGE INSTRUCTIONS
Pelvic Medicine Postoperative Instructions:    PAIN CONTROL  Take Ibuprofen every 6 hours as needed for pain. 1. AVOID CONSTIPATION:   -Take Miralax one capful in water or juice each morning.    -Take Fiber supplement along with Miralax as well. These can be mixed together in the same glass of liquid.  -On any day that you don't have a bowel movement, you can take 1-2 teaspoons of Milk of Magnesia that evening   2. No lifting over 10 pounds (1 gallon of milk is 8 pounds). 3. Showers and tub baths are okay, even if you have a catheter or abdominal incision. 4. You may ride in a car immediately. 5. You may drive after 1-2 weeks as long as you are not taking any narcotic pain medications    Please call us for any of the following:  -Temperature above 100.5 for 4 hours or above 101.0 at any time.  -Chest pain or trouble breathing.  -Vaginal bleeding heavier than a period.  -Redness, tenderness or swelling of your legs  -Pain or burning when you urinate; or if you go home with a catheter, trouble with catheter draining.  -Redness, pain or foul discharge from incision.     Office telephone: [phone]  After hours: [phone]

## 2023-03-09 ENCOUNTER — TELEPHONE (OUTPATIENT)
Dept: NEUROLOGY | Facility: CLINIC | Age: 58
End: 2023-03-09

## 2023-03-09 NOTE — TELEPHONE ENCOUNTER
Received fax from [facility] received for patient use of Zolmitriptan  Approval granted: 3/9/23-3/9/24        Pt notified

## 2023-03-23 ENCOUNTER — OFFICE VISIT (OUTPATIENT)
Dept: NEUROLOGY | Facility: CLINIC | Age: 58
End: 2023-03-23
Payer: COMMERCIAL

## 2023-03-23 VITALS
BODY MASS INDEX: 30.53 KG/M2 | SYSTOLIC BLOOD PRESSURE: 132 MMHG | HEIGHT: 66 IN | DIASTOLIC BLOOD PRESSURE: 79 MMHG | HEART RATE: 96 BPM | RESPIRATION RATE: 16 BRPM | WEIGHT: 190 LBS

## 2023-03-23 DIAGNOSIS — M79.18 CERVICAL MYOFASCIAL PAIN SYNDROME: ICD-10-CM

## 2023-03-23 DIAGNOSIS — G43.709 CHRONIC MIGRAINE W/O AURA W/O STATUS MIGRAINOSUS, NOT INTRACTABLE: Primary | ICD-10-CM

## 2023-03-23 PROCEDURE — 3075F SYST BP GE 130 - 139MM HG: CPT | Performed by: OTHER

## 2023-03-23 PROCEDURE — 99214 OFFICE O/P EST MOD 30 MIN: CPT | Performed by: OTHER

## 2023-03-23 PROCEDURE — 3008F BODY MASS INDEX DOCD: CPT | Performed by: OTHER

## 2023-03-23 PROCEDURE — 3078F DIAST BP <80 MM HG: CPT | Performed by: OTHER

## 2023-03-23 RX ORDER — ERENUMAB-AOOE 140 MG/ML
140 INJECTION, SOLUTION SUBCUTANEOUS ONCE
COMMUNITY
End: 2023-03-23

## 2023-03-23 RX ORDER — ERENUMAB-AOOE 140 MG/ML
140 INJECTION, SOLUTION SUBCUTANEOUS ONCE
Qty: 3 ML | Refills: 3 | Status: SHIPPED | OUTPATIENT
Start: 2023-03-23 | End: 2023-03-23

## 2023-03-23 RX ORDER — GABAPENTIN 600 MG/1
600 TABLET ORAL 3 TIMES DAILY
Qty: 270 TABLET | Refills: 3 | Status: SHIPPED | OUTPATIENT
Start: 2023-03-23

## 2023-03-23 RX ORDER — ZOLMITRIPTAN 5 MG/1
1 SPRAY NASAL AS NEEDED
Qty: 12 EACH | Refills: 3 | Status: SHIPPED | OUTPATIENT
Start: 2023-03-23

## 2023-04-21 ENCOUNTER — OFFICE VISIT (OUTPATIENT)
Dept: UROLOGY | Facility: CLINIC | Age: 58
End: 2023-04-21
Attending: OBSTETRICS & GYNECOLOGY
Payer: COMMERCIAL

## 2023-04-21 VITALS — BODY MASS INDEX: 30.53 KG/M2 | HEIGHT: 66 IN | WEIGHT: 190 LBS | TEMPERATURE: 97 F

## 2023-04-21 DIAGNOSIS — Z98.890 POST-OPERATIVE STATE: Primary | ICD-10-CM

## 2023-04-21 DIAGNOSIS — N95.2 POSTMENOPAUSAL ATROPHIC VAGINITIS: ICD-10-CM

## 2023-04-21 PROCEDURE — 99212 OFFICE O/P EST SF 10 MIN: CPT

## 2023-07-21 ENCOUNTER — OFFICE VISIT (OUTPATIENT)
Dept: UROLOGY | Facility: CLINIC | Age: 58
End: 2023-07-21
Attending: OBSTETRICS & GYNECOLOGY
Payer: COMMERCIAL

## 2023-07-21 DIAGNOSIS — Z98.890 POSTOPERATIVE STATE: ICD-10-CM

## 2023-07-21 DIAGNOSIS — N95.2 POSTMENOPAUSAL ATROPHIC VAGINITIS: Primary | ICD-10-CM

## 2023-07-21 PROCEDURE — 99212 OFFICE O/P EST SF 10 MIN: CPT

## 2023-07-21 RX ORDER — ESTRADIOL 0.1 MG/G
CREAM VAGINAL
Qty: 42.5 G | Refills: 3 | Status: SHIPPED | OUTPATIENT
Start: 2023-07-21

## 2023-07-24 NOTE — TELEPHONE ENCOUNTER
I spoke with the patient who is currently at her PCP's office and they are requesting faxed documentation from us regarding medical clearance as she is scheduled with Dr. Anastasia Wade for surgery on 10/31/2019-  She understands that I will fax this document asap-
Detail Level: Detailed

## 2023-09-05 ENCOUNTER — TELEPHONE (OUTPATIENT)
Dept: NEUROLOGY | Facility: CLINIC | Age: 58
End: 2023-09-05

## 2023-09-12 RX ORDER — ZOLMITRIPTAN 5 MG/1
1 SPRAY NASAL AS NEEDED
Qty: 12 EACH | Refills: 2 | Status: SHIPPED | OUTPATIENT
Start: 2023-09-12

## 2023-12-08 ENCOUNTER — LAB ENCOUNTER (OUTPATIENT)
Dept: LAB | Age: 58
End: 2023-12-08
Payer: COMMERCIAL

## 2023-12-08 DIAGNOSIS — E11.00 TYPE II DIABETES MELLITUS WITH HYPEROSMOLARITY, UNCONTROLLED (HCC): Primary | ICD-10-CM

## 2023-12-08 DIAGNOSIS — E11.9 DIABETES MELLITUS (HCC): ICD-10-CM

## 2023-12-08 LAB
ALBUMIN SERPL-MCNC: 3.8 G/DL (ref 3.4–5)
ALBUMIN/GLOB SERPL: 1.2 {RATIO} (ref 1–2)
ALP LIVER SERPL-CCNC: 109 U/L
ALT SERPL-CCNC: 30 U/L
ANION GAP SERPL CALC-SCNC: 3 MMOL/L (ref 0–18)
AST SERPL-CCNC: 18 U/L (ref 15–37)
BILIRUB SERPL-MCNC: 0.4 MG/DL (ref 0.1–2)
BUN BLD-MCNC: 12 MG/DL (ref 9–23)
CALCIUM BLD-MCNC: 8.8 MG/DL (ref 8.5–10.1)
CHLORIDE SERPL-SCNC: 105 MMOL/L (ref 98–112)
CHOLEST SERPL-MCNC: 152 MG/DL (ref ?–200)
CO2 SERPL-SCNC: 31 MMOL/L (ref 21–32)
CREAT BLD-MCNC: 0.79 MG/DL
CREAT UR-SCNC: 42 MG/DL
EGFRCR SERPLBLD CKD-EPI 2021: 87 ML/MIN/1.73M2 (ref 60–?)
EST. AVERAGE GLUCOSE BLD GHB EST-MCNC: 177 MG/DL (ref 68–126)
FASTING PATIENT LIPID ANSWER: YES
FASTING STATUS PATIENT QL REPORTED: YES
GLOBULIN PLAS-MCNC: 3.2 G/DL (ref 2.8–4.4)
GLUCOSE BLD-MCNC: 134 MG/DL (ref 70–99)
HBA1C MFR BLD: 7.8 % (ref ?–5.7)
HDLC SERPL-MCNC: 61 MG/DL (ref 40–59)
LDLC SERPL CALC-MCNC: 64 MG/DL (ref ?–100)
MICROALBUMIN UR-MCNC: <0.5 MG/DL
NONHDLC SERPL-MCNC: 91 MG/DL (ref ?–130)
OSMOLALITY SERPL CALC.SUM OF ELEC: 290 MOSM/KG (ref 275–295)
POTASSIUM SERPL-SCNC: 3.6 MMOL/L (ref 3.5–5.1)
PROT SERPL-MCNC: 7 G/DL (ref 6.4–8.2)
SODIUM SERPL-SCNC: 139 MMOL/L (ref 136–145)
TRIGL SERPL-MCNC: 161 MG/DL (ref 30–149)
VLDLC SERPL CALC-MCNC: 24 MG/DL (ref 0–30)

## 2023-12-08 PROCEDURE — 83036 HEMOGLOBIN GLYCOSYLATED A1C: CPT

## 2023-12-08 PROCEDURE — 82570 ASSAY OF URINE CREATININE: CPT

## 2023-12-08 PROCEDURE — 80061 LIPID PANEL: CPT

## 2023-12-08 PROCEDURE — 82043 UR ALBUMIN QUANTITATIVE: CPT

## 2023-12-08 PROCEDURE — 80053 COMPREHEN METABOLIC PANEL: CPT

## 2024-01-04 ENCOUNTER — TELEPHONE (OUTPATIENT)
Dept: NEUROLOGY | Facility: CLINIC | Age: 59
End: 2024-01-04

## 2024-01-04 RX ORDER — ZOLMITRIPTAN 5 MG/1
1 SPRAY NASAL AS NEEDED
Qty: 12 EACH | Refills: 2 | Status: SHIPPED | OUTPATIENT
Start: 2024-01-04

## 2024-01-04 NOTE — TELEPHONE ENCOUNTER
Medication: Zolmitriptan 5 MG      Date of last refill:2023 (#12/2R)  [place]     Last office visit: 3/23/23  Due back to clinic per last office note:  1yr  Date next office visit scheduled:    No future appointments.        Last OV note recommendation:     Problem/s Identified this visit:   Chronic migraine w/o aura w/o status migrainosus, not intractable  (primary encounter diagnosis)  Cervical myofascial pain syndrome        Discussion plus Diagnostics & Treatment Orders:  Orders Placed This Encounter      Zolmitriptan 5 MG Nasal Solution          Si spray by Nasal route as needed for Migraine.          Dispense:  12 each          Refill:  3      DISCONTD: Erenumab-aooe (AIMOVIG) 140 MG/ML Subcutaneous Solution Auto-injector          Sig: Inject 1 mL (140 mg total) into the skin once.      gabapentin 600 MG Oral Tab          Sig: Take 1 tablet (600 mg total) by mouth 3 (three) times daily.          Dispense:  270 tablet          Refill:  3      Erenumab-aooe (AIMOVIG) 140 MG/ML Subcutaneous Solution Auto-injector          Sig: Inject 1 mL (140 mg total) into the skin once for 1 dose. Inject 1 mL (140 mg total) into the skin once.          Dispense:  3 mL          Refill:  3

## 2024-02-08 NOTE — TELEPHONE ENCOUNTER
PT progress note reviewed and signed by Dr. Stephanie Ochoa.      Plan: 2-3x per week  Duration: 6 weeks  Treatment: therapeutic exercises (strength, endurance, stability), gait training (even surfaces, uneven surfaces, stairs), neuromuscular rehabilitation, manu
Patient Specific Counseling (Will Not Stick From Patient To Patient): ==\\nPt and RJ discuss handheld UVB device
Detail Level: Zone

## 2024-03-07 ENCOUNTER — PATIENT MESSAGE (OUTPATIENT)
Dept: NEUROLOGY | Facility: CLINIC | Age: 59
End: 2024-03-07

## 2024-03-07 NOTE — TELEPHONE ENCOUNTER
From: Francheska Fernández  To: Buzz Dutta  Sent: 3/7/2024 11:21 AM CST  Subject: Prior authorization    I received the following attached letter from my insurance company regarding Zolmitriptan prior authorization.   Francheska Fernández

## 2024-03-11 NOTE — TELEPHONE ENCOUNTER
Received fax from [employer]   Approval received for patient use of Zolmitriptan   Approval granted: 2/10/24-3/10/25        Pt notified

## 2024-03-16 DIAGNOSIS — G43.709 CHRONIC MIGRAINE W/O AURA W/O STATUS MIGRAINOSUS, NOT INTRACTABLE: Primary | ICD-10-CM

## 2024-03-18 RX ORDER — ERENUMAB-AOOE 140 MG/ML
INJECTION, SOLUTION SUBCUTANEOUS
Qty: 3 ML | Refills: 1 | Status: SHIPPED | OUTPATIENT
Start: 2024-03-18

## 2024-03-18 NOTE — TELEPHONE ENCOUNTER
Medication: Aimovig 140MG      Date of last refill:2023 with 3 addt refills  [place]     Last office visit: 3/23/23  Due back to clinic per last office note:  1yr  Date next office visit scheduled:  2024  No future appointments.        Last OV note recommendation:     Problem/s Identified this visit:   Chronic migraine w/o aura w/o status migrainosus, not intractable  (primary encounter diagnosis)  Cervical myofascial pain syndrome        Discussion plus Diagnostics & Treatment Orders:  Orders Placed This Encounter      Zolmitriptan 5 MG Nasal Solution          Si spray by Nasal route as needed for Migraine.          Dispense:  12 each          Refill:  3      DISCONTD: Erenumab-aooe (AIMOVIG) 140 MG/ML Subcutaneous Solution Auto-injector          Sig: Inject 1 mL (140 mg total) into the skin once.      gabapentin 600 MG Oral Tab          Sig: Take 1 tablet (600 mg total) by mouth 3 (three) times daily.          Dispense:  270 tablet          Refill:  3      Erenumab-aooe (AIMOVIG) 140 MG/ML Subcutaneous Solution Auto-injector          Sig: Inject 1 mL (140 mg total) into the skin once for 1 dose. Inject 1 mL (140 mg total) into the skin once.          Dispense:  3 mL          Refill:  3

## 2024-04-19 DIAGNOSIS — M79.18 CERVICAL MYOFASCIAL PAIN SYNDROME: ICD-10-CM

## 2024-04-19 RX ORDER — GABAPENTIN 600 MG/1
600 TABLET ORAL 3 TIMES DAILY
Qty: 270 TABLET | Refills: 0 | Status: SHIPPED | OUTPATIENT
Start: 2024-04-19

## 2024-04-19 NOTE — TELEPHONE ENCOUNTER
Medication: Gabapentin 600 mg     Date of last refill:2023 with 3 addt refills  [place]     Last office visit: 3/23/23  Due back to clinic per last office note:  1yr  Date next office visit scheduled:  2024  No future appointments.        Last OV note recommendation:     Problem/s Identified this visit:   Chronic migraine w/o aura w/o status migrainosus, not intractable  (primary encounter diagnosis)  Cervical myofascial pain syndrome        Discussion plus Diagnostics & Treatment Orders:  Orders Placed This Encounter      Zolmitriptan 5 MG Nasal Solution          Si spray by Nasal route as needed for Migraine.          Dispense:  12 each          Refill:  3      DISCONTD: Erenumab-aooe (AIMOVIG) 140 MG/ML Subcutaneous Solution Auto-injector          Sig: Inject 1 mL (140 mg total) into the skin once.      gabapentin 600 MG Oral Tab          Sig: Take 1 tablet (600 mg total) by mouth 3 (three) times daily.          Dispense:  270 tablet          Refill:  3      Erenumab-aooe (AIMOVIG) 140 MG/ML Subcutaneous Solution Auto-injector          Sig: Inject 1 mL (140 mg total) into the skin once for 1 dose. Inject 1 mL (140 mg total) into the skin once.          Dispense:  3 mL          Refill:  3

## 2024-04-23 DIAGNOSIS — M79.18 CERVICAL MYOFASCIAL PAIN SYNDROME: ICD-10-CM

## 2024-04-23 RX ORDER — GABAPENTIN 600 MG/1
600 TABLET ORAL 3 TIMES DAILY
Qty: 270 TABLET | Refills: 0 | OUTPATIENT
Start: 2024-04-23

## 2024-05-01 ENCOUNTER — OFFICE VISIT (OUTPATIENT)
Dept: NEUROLOGY | Facility: CLINIC | Age: 59
End: 2024-05-01
Payer: MEDICARE

## 2024-05-01 VITALS
DIASTOLIC BLOOD PRESSURE: 68 MMHG | WEIGHT: 190 LBS | SYSTOLIC BLOOD PRESSURE: 100 MMHG | HEIGHT: 66 IN | HEART RATE: 92 BPM | RESPIRATION RATE: 16 BRPM | BODY MASS INDEX: 30.53 KG/M2

## 2024-05-01 DIAGNOSIS — G43.011 INTRACTABLE MIGRAINE WITHOUT AURA AND WITH STATUS MIGRAINOSUS: ICD-10-CM

## 2024-05-01 DIAGNOSIS — M79.18 CERVICAL MYOFASCIAL PAIN SYNDROME: ICD-10-CM

## 2024-05-01 DIAGNOSIS — G43.709 CHRONIC MIGRAINE W/O AURA W/O STATUS MIGRAINOSUS, NOT INTRACTABLE: Primary | ICD-10-CM

## 2024-05-01 PROCEDURE — 99214 OFFICE O/P EST MOD 30 MIN: CPT | Performed by: OTHER

## 2024-05-01 PROCEDURE — 96372 THER/PROPH/DIAG INJ SC/IM: CPT | Performed by: OTHER

## 2024-05-01 RX ORDER — DEXAMETHASONE SODIUM PHOSPHATE 10 MG/ML
10 INJECTION, SOLUTION INTRAMUSCULAR; INTRAVENOUS ONCE
Status: COMPLETED | OUTPATIENT
Start: 2024-05-01 | End: 2024-05-01

## 2024-05-01 RX ORDER — ZOLMITRIPTAN 5 MG/1
1 SPRAY NASAL AS NEEDED
Qty: 12 EACH | Refills: 2 | Status: SHIPPED | OUTPATIENT
Start: 2024-05-01

## 2024-05-01 RX ORDER — GABAPENTIN 600 MG/1
600 TABLET ORAL 3 TIMES DAILY
Qty: 270 TABLET | Refills: 0 | Status: SHIPPED | OUTPATIENT
Start: 2024-05-01

## 2024-05-01 RX ORDER — ERENUMAB-AOOE 140 MG/ML
140 INJECTION, SOLUTION SUBCUTANEOUS
Qty: 3 ML | Refills: 3 | Status: SHIPPED | OUTPATIENT
Start: 2024-05-01

## 2024-05-01 RX ORDER — KETOROLAC TROMETHAMINE 30 MG/ML
30 INJECTION, SOLUTION INTRAMUSCULAR; INTRAVENOUS ONCE
Status: COMPLETED | OUTPATIENT
Start: 2024-05-01 | End: 2024-05-01

## 2024-05-01 RX ORDER — RIMEGEPANT SULFATE 75 MG/75MG
75 TABLET, ORALLY DISINTEGRATING ORAL AS NEEDED
Qty: 4 TABLET | Refills: 0 | Status: SHIPPED | COMMUNITY
Start: 2024-05-01 | End: 2025-05-01

## 2024-05-01 RX ORDER — METHYLPREDNISOLONE 4 MG/1
TABLET ORAL
Qty: 1 EACH | Refills: 0 | Status: SHIPPED | OUTPATIENT
Start: 2024-05-01

## 2024-05-01 RX ADMIN — KETOROLAC TROMETHAMINE 30 MG: 30 INJECTION, SOLUTION INTRAMUSCULAR; INTRAVENOUS at 14:43:00

## 2024-05-01 RX ADMIN — DEXAMETHASONE SODIUM PHOSPHATE 10 MG: 10 INJECTION, SOLUTION INTRAMUSCULAR; INTRAVENOUS at 14:42:00

## 2024-05-01 NOTE — PROGRESS NOTES
Toradol 30mg and Decadron 10mg IM injections given per [facility].  See MAR for admin details.  Consent form signed prior to injections.  Pt tolerated well.

## 2024-05-01 NOTE — PATIENT INSTRUCTIONS
Refill policies:    Allow 2-3 business days for refills; controlled substances may take longer.  Contact your pharmacy at least 5 days prior to running out of medication and have them send an electronic request or submit request through the “request refill” option in your [employer] account.  Refills are not addressed on weekends; covering physicians do not authorize routine medications on weekends.  No narcotics or controlled substances are refilled after noon on Fridays or by on call physicians.  By law, narcotics must be electronically prescribed.  A 30 day supply with no refills is the maximum allowed.  If your prescription is due for a refill, you may be due for a follow up appointment.  To best provide you care, patients receiving routine medications need to be seen at least once a year.  Patients receiving narcotic/controlled substance medications need to be seen at least once every 3 months.  In the event that your preferred pharmacy does not have the requested medication in stock (e.g. Backordered), it is your responsibility to find another pharmacy that has the requested medication available.  We will gladly send a new prescription to that pharmacy at your request.    Scheduling Tests:    If your physician has ordered radiology tests such as MRI or CT scans, please contact Central Scheduling at [phone] right away to schedule the test.  Once scheduled, the [facility] Centralized Referral Team will work with your insurance carrier to obtain pre-certification or prior authorization.  Depending on your insurance carrier, approval may take 3-10 days.  It is highly recommended patients assure they have received an authorization before having a test performed.  If test is done without insurance authorization, patient may be responsible for the entire amount billed.      Precertification and Prior Authorizations:  If your physician has recommended that you have a procedure or additional testing performed the [facility]  Centralized Referral Team will contact your insurance carrier to obtain pre-certification or prior authorization.    You are strongly encouraged to contact your insurance carrier to verify that your procedure/test has been approved and is a COVERED benefit.  Although the [facility] Centralized Referral Team does its due diligence, the insurance carrier gives the disclaimer that \"Although the procedure is authorized, this does not guarantee payment.\"    Ultimately the patient is responsible for payment.   Thank you for your understanding in this matter.  Paperwork Completion:  If you require FMLA or disability paperwork for your recovery, please make sure to either drop it off or have it faxed to our office at [phone]. Be sure the form has your name and date of birth on it.  The form will be faxed to our Forms Department and they will complete it for you.  There is a 25$ fee for all forms that need to be filled out.  Please be aware there is a 10-14 day turnaround time.  You will need to sign a release of information (CALDERON) form if your paperwork does not come with one.  You may call the Forms Department with any questions at [phone].  Their fax number is [phone].

## 2024-05-01 NOTE — PROGRESS NOTES
medNEUROLOGY  [facility]       Francheska Fernández  12/31/1965  Primary Care Provider:  Emily Ly    5/1/2024  58 year old yo,  was last seen on:: MArch 2023    Seen for/plans last visit:  migreaines    Previous visit and existing record notes reviewed in preparation for the face to face visit.  Relevant labs and studies reviewed and will be noted in relevant areas of this record.  Accompanied visit:     () No.      Present condition:  Has been doing well but the last 4 days headache has been having intractable headache    Past History update/new problem(s): no new    Review of Systems:  Review of Systems:  Denies systemic symptoms     No CP or SOB.  No GI or  symptoms. Relevant Neuro as noted above.      Medications:      Current Outpatient Medications:     methylPREDNISolone (MEDROL) 4 MG Oral Tablet Therapy Pack, Take as directed, Disp: 1 each, Rfl: 0    Zolmitriptan 5 MG Nasal Solution, 1 spray by Nasal route as needed for Migraine., Disp: 12 each, Rfl: 2    Erenumab-aooe (AIMOVIG) 140 MG/ML Subcutaneous Solution Auto-injector, Inject 1 mL (140 mg total) into the skin every 30 (thirty) days., Disp: 3 mL, Rfl: 3    gabapentin 600 MG Oral Tab, Take 1 tablet (600 mg total) by mouth 3 (three) times daily., Disp: 270 tablet, Rfl: 0    estradiol (ESTRACE) 0.1 MG/GM Vaginal Cream, Apply 1 gram vaginally 2-3 times per week., Disp: 42.5 g, Rfl: 3    ibuprofen 600 MG Oral Tab, Take 1 tablet (600 mg total) by mouth every 6 (six) hours., Disp: 40 tablet, Rfl: 0    QVAR REDIHALER 40 MCG/ACT Inhalation Aerosol, Breath Activated, Inhale 2 puffs into the lungs every 12 (twelve) hours as needed., Disp: , Rfl:     Naltrexone-buPROPion HCl ER (CONTRAVE) 8-90 MG Oral Tablet 12 Hr, Take 2 tablets by mouth in the morning and 2 tablets before bedtime., Disp: , Rfl:     NON FORMULARY, Tart cherry juice daily for arthritis, Disp: , Rfl:     TURMERIC OR, Take by mouth., Disp: , Rfl:     Azelastine  HCl 137 MCG/SPRAY Nasal Solution, , Disp: , Rfl:     Melatonin 5 MG Oral Chew Tab, Chew 12 mg by mouth nightly as needed., Disp: , Rfl:     TRULICITY 4.5 MG/0.5ML Subcutaneous Solution Pen-injector, 3 mg., Disp: , Rfl:     DROPLET PEN NEEDLES 32G X 6 MM Does not apply Misc, , Disp: , Rfl:     rosuvastatin 20 MG Oral Tab, Take 1 tablet (20 mg total) by mouth nightly., Disp: , Rfl:     diphenhydrAMINE HCl 25 MG Oral Tab, Take 1 tablet (25 mg total) by mouth nightly., Disp: , Rfl:     Meloxicam 15 MG Oral Tab, as needed., Disp: , Rfl:     ondansetron 4 MG Oral Tablet Dispersible, Place 1 tablet (4 mg total) under the tongue every 8 (eight) hours as needed., Disp: , Rfl:     Continuous Blood Gluc  (FREESTYLE ASNDY 2 READER) Does not apply Device, , Disp: , Rfl:     Continuous Blood Gluc Sensor (FREESTYLE SANDY 2 SENSOR) Does not apply Misc, 1 Device every 14 (fourteen) days. States wears 2 weeks on and 2 weeks off; will be off at time of 3/6/23 surgery, Disp: , Rfl:     Esomeprazole Magnesium 40 MG Oral Capsule Delayed Release, Take 1 capsule (40 mg total) by mouth 2 (two) times daily., Disp: , Rfl:     NOVOLOG MIX 70/30 FLEXPEN (70-30) 100 UNIT/ML Subcutaneous Suspension Pen-injector, Inject 8 Units into the skin 2 (two) times daily before meals., Disp: , Rfl:     Hydroxychloroquine Sulfate 200 MG Oral Tab, Take 1 tablet (200 mg total) by mouth daily., Disp: , Rfl:     furosemide 20 MG Oral Tab, Take 1 tablet (20 mg total) by mouth daily., Disp: , Rfl:     acetaminophen 500 MG Oral Tab, Take 1 tablet (500 mg total) by mouth every 6 (six) hours as needed for Pain., Disp: , Rfl:     Probiotic Product (PROBIOTIC ACIDOPHILUS BEADS OR), Take 1 tablet by mouth daily.  , Disp: , Rfl:     cetirizine 10 MG Oral Tab, Take 1 tablet (10 mg total) by mouth daily., Disp: , Rfl:     Glucose Blood (CONTOUR NEXT TEST) In Vitro Strip, by Other route.  , Disp: , Rfl:     dapagliflozin 10 MG Oral Tab, Take 1 tablet (10 mg total)  by mouth every morning., Disp: , Rfl:     Meclizine HCl 25 MG Oral Tab, Take 1 tablet (25 mg total) by mouth 3 (three) times daily as needed., Disp: , Rfl:     Omega-3 1400 MG Oral Cap, Take 2 capsules by mouth daily.  , Disp: , Rfl:     carboxymethylcellulose sod PF 1 % Ophthalmic Gel, Place 1 drop into both eyes as needed., Disp: , Rfl:     aspirin 81 MG Oral Tab, Take 1 tablet (81 mg total) by mouth daily., Disp: , Rfl:   PRN:     Allergies:  Allergies   Allergen Reactions    Adhesive Tape RASH     States can tolerate scopolamine patch    Latex RASH    Nickel RASH    Opioid Analgesics OTHER (SEE COMMENTS)     Severe vasovagel    Peanuts ANAPHYLAXIS    Topiramate ANAPHYLAXIS    Tylenol With Codeine NAUSEA AND VOMITING    Valium [Diazepam] OTHER (SEE COMMENTS)     tachycardia    Valproic Acid OTHER (SEE COMMENTS)     Tremors, horse voice, weight gain, hair loss    Hydrocodone NAUSEA AND VOMITING    Propoxyphene NAUSEA AND VOMITING    Sodium Pentobarbital [Pentobarbital] NAUSEA AND VOMITING    Sulfa Antibiotics NAUSEA AND VOMITING    Acetaminophen NAUSEA ONLY     If taking for more than 24 hours; states can take 1000mg per pre surgical instructions    Byetta RASH    Influenza Vaccines RASH    Metformin DIARRHEA    Metformin Hcl RASH          EXAM:  /68 (BP Location: Right arm, Patient Position: Sitting, Cuff Size: adult)   Pulse 92   Resp 16   Ht 66\"   Wt 190 lb (86.2 kg)   LMP  (LMP Unknown)   BMI 30.67 kg/m²   Looks stated age  General Exam:  HENT:  pink conjunctiva anicteric sclerae  Neck no adenopathy, thyroid normal  Heart and Lungs:  normal  Extremities: no cyanosis, skin changes    NEURO  NEURO  Able to relate events with fluent speech and intact comprehension  CN 2-12: pupils reactive, VF full face symmetric sensation and movement tongue midline  No motor focal findings  Sensory: no lateralizing findings  Reflexes are symmetric  UMN signs: none  Gait: narrow based          INTERPRETATION of  RELEVANT LABS and other DATA:          Problem/s Identified this visit:   1. Chronic migraine w/o aura w/o status migrainosus, not intractable    2. Cervical myofascial pain syndrome    3. Intractable migraine without aura and with status migrainosus          Discussion plus Diagnostics & Treatment Orders:  Today we will need to intervene with Toradol 30 mg and dexamethasone 10 mg and will start Medrol Dosepak tomorrow.    Samples of Nurtec to be taken 1 a day as needed for the next 3 to 4 days.  May only use Zomig as a rescue during this days.    Renewed all the other medications.  Follow-up in 1 year.  Sooner if headaches are not better    Orders Placed This Encounter    methylPREDNISolone (MEDROL) 4 MG Oral Tablet Therapy Pack     Sig: Take as directed     Dispense:  1 each     Refill:  0    Zolmitriptan 5 MG Nasal Solution     Si spray by Nasal route as needed for Migraine.     Dispense:  12 each     Refill:  2    Erenumab-aooe (AIMOVIG) 140 MG/ML Subcutaneous Solution Auto-injector     Sig: Inject 1 mL (140 mg total) into the skin every 30 (thirty) days.     Dispense:  3 mL     Refill:  3    gabapentin 600 MG Oral Tab     Sig: Take 1 tablet (600 mg total) by mouth 3 (three) times daily.     Dispense:  270 tablet     Refill:  0     Refer for Acupuncture      (x) Discussed potential side effects of any treatment relevant to above.  Includes explanation of tests as necessary.    Return in about 1 year (around 2025).      Patient understands that if needed, based on condition and or test results, follow up will be readjusted      Buzz Dutta MD  Vascular & General Neurology  Director, Multiple Sclerosis Program  [facility]  2024, Time completed 2:24 PM    Decision making:  ( x ) labs reviewed/ordered - 1  (  ) new diagnosis: - 1  ( x) Images & studies independently reviewed -non F2F  (  ) Case/studies discussed with other caregivers - -non F2F  (  ) Telephone time with patiern or  authorized [facility] member--non F2F  ( x ) other records reviewed --non F2F including consultations  (  ) [facility] meetings - patient not present --non F2F  (  ) Independent Historian obtained    Non Face to Face CPT code 20048/56540 applies as documented above    PROCEDURE DONE     (   ) see notes        After visit, patient was escorted out and handed-off discharge process and instructions to the check out desk.  No additional issues relevant to visit were raised to staff at this time interval.        This document is to be interpreted as my current opinion regarding the case as of the stated date of service based on the information available to me at this time and may supersedes any prior opinion expressed either orally or in writing.  Services rendered are only within the scope of direct medical care  Sometimes, reports may have been prepared partially using a speech recognition software technology.  If a word or phrase is confusing or out of context, please do not hesitate to call for clarification.

## 2024-05-02 ENCOUNTER — TELEPHONE (OUTPATIENT)
Dept: NEUROLOGY | Facility: CLINIC | Age: 59
End: 2024-05-02

## 2024-05-02 NOTE — TELEPHONE ENCOUNTER
PA requested for [facility]  Clinical questions answered and submitted to insurance    Received fax from [employer]   Approval received for patient use of Nurtec   Approval granted: 4/2/24-5/2/25        Pt notified

## 2024-05-17 ENCOUNTER — TELEPHONE (OUTPATIENT)
Dept: NEUROLOGY | Facility: CLINIC | Age: 59
End: 2024-05-17

## 2024-05-17 DIAGNOSIS — G43.709 CHRONIC MIGRAINE W/O AURA W/O STATUS MIGRAINOSUS, NOT INTRACTABLE: Primary | ICD-10-CM

## 2024-05-17 RX ORDER — RIMEGEPANT SULFATE 75 MG/75MG
75 TABLET, ORALLY DISINTEGRATING ORAL AS NEEDED
Qty: 8 TABLET | Refills: 5 | Status: SHIPPED | OUTPATIENT
Start: 2024-05-17 | End: 2025-05-17

## 2024-05-17 NOTE — TELEPHONE ENCOUNTER
Patient calling if we can faxed over the Nuretec to the pharmacy and has questions on alternative medication

## 2024-05-17 NOTE — TELEPHONE ENCOUNTER
Patient calling because Nurtec is helping. Requesting prescription. Order pended per provider's discretion.

## 2024-07-13 DIAGNOSIS — M79.18 CERVICAL MYOFASCIAL PAIN SYNDROME: ICD-10-CM

## 2024-07-15 RX ORDER — GABAPENTIN 600 MG/1
600 TABLET ORAL 3 TIMES DAILY
Qty: 270 TABLET | Refills: 3 | Status: SHIPPED | OUTPATIENT
Start: 2024-07-15

## 2024-07-15 NOTE — TELEPHONE ENCOUNTER
Medication: gabapentin 600 MG      Date of last refill: 24 (#270/0R)  [place]     Last office visit: 24  Due back to clinic per last office note:  1 yr  Date next office visit scheduled:    No future appointments.        Last OV note recommendation:    Problem/s Identified this visit:   1. Chronic migraine w/o aura w/o status migrainosus, not intractable    2. Cervical myofascial pain syndrome    3. Intractable migraine without aura and with status migrainosus             Discussion plus Diagnostics & Treatment Orders:  Today we will need to intervene with Toradol 30 mg and dexamethasone 10 mg and will start Medrol Dosepak tomorrow.     Samples of Nurtec to be taken 1 a day as needed for the next 3 to 4 days.  May only use Zomig as a rescue during this days.     Renewed all the other medications.  Follow-up in 1 year.  Sooner if headaches are not better          Orders Placed This Encounter    methylPREDNISolone (MEDROL) 4 MG Oral Tablet Therapy Pack       Sig: Take as directed       Dispense:  1 each       Refill:  0    Zolmitriptan 5 MG Nasal Solution       Si spray by Nasal route as needed for Migraine.       Dispense:  12 each       Refill:  2    Erenumab-aooe (AIMOVIG) 140 MG/ML Subcutaneous Solution Auto-injector       Sig: Inject 1 mL (140 mg total) into the skin every 30 (thirty) days.       Dispense:  3 mL       Refill:  3    gabapentin 600 MG Oral Tab       Sig: Take 1 tablet (600 mg total) by mouth 3 (three) times daily.       Dispense:  270 tablet       Refill:  0      Refer for Acupuncture

## 2024-09-04 ENCOUNTER — TELEPHONE (OUTPATIENT)
Dept: NEUROLOGY | Facility: CLINIC | Age: 59
End: 2024-09-04

## 2024-09-04 NOTE — TELEPHONE ENCOUNTER
Received approval for Aimovig effective 8/5-9/4/25. Case ID [id number].    Patient notified via mychart.

## 2024-10-10 DIAGNOSIS — M79.18 CERVICAL MYOFASCIAL PAIN SYNDROME: ICD-10-CM

## 2024-10-10 NOTE — TELEPHONE ENCOUNTER
Medication: gabapentin 600 MG      Date of last refill: 07/15/2024(#270/3R)  [place]     Last office visit: 24  Due back to clinic per last office note:  1 yr  Date next office visit scheduled:    No future appointments.        Last OV note recommendation:     Problem/s Identified this visit:   1. Chronic migraine w/o aura w/o status migrainosus, not intractable    2. Cervical myofascial pain syndrome    3. Intractable migraine without aura and with status migrainosus             Discussion plus Diagnostics & Treatment Orders:  Today we will need to intervene with Toradol 30 mg and dexamethasone 10 mg and will start Medrol Dosepak tomorrow.     Samples of Nurtec to be taken 1 a day as needed for the next 3 to 4 days.  May only use Zomig as a rescue during this days.     Renewed all the other medications.  Follow-up in 1 year.  Sooner if headaches are not better             Orders Placed This Encounter    methylPREDNISolone (MEDROL) 4 MG Oral Tablet Therapy Pack       Sig: Take as directed       Dispense:  1 each       Refill:  0    Zolmitriptan 5 MG Nasal Solution       Si spray by Nasal route as needed for Migraine.       Dispense:  12 each       Refill:  2    Erenumab-aooe (AIMOVIG) 140 MG/ML Subcutaneous Solution Auto-injector       Sig: Inject 1 mL (140 mg total) into the skin every 30 (thirty) days.       Dispense:  3 mL       Refill:  3    gabapentin 600 MG Oral Tab       Sig: Take 1 tablet (600 mg total) by mouth 3 (three) times daily.       Dispense:  270 tablet       Refill:  0      Refer for Acupuncture

## 2024-10-11 RX ORDER — GABAPENTIN 600 MG/1
600 TABLET ORAL 3 TIMES DAILY
Qty: 270 TABLET | Refills: 0 | OUTPATIENT
Start: 2024-10-11

## 2024-10-15 DIAGNOSIS — M79.18 CERVICAL MYOFASCIAL PAIN SYNDROME: ICD-10-CM

## 2024-10-16 RX ORDER — GABAPENTIN 600 MG/1
600 TABLET ORAL 3 TIMES DAILY
Qty: 270 TABLET | Refills: 3 | Status: SHIPPED | OUTPATIENT
Start: 2024-10-16

## 2024-10-16 NOTE — TELEPHONE ENCOUNTER
Gabapentin 600mg refilled 7/15/24 #270/3R to [facility] in [place]  Request received from [facility] in [facility] msg sent to pt to confirm which pharmacy is preferred    Pending for response

## 2024-11-11 RX ORDER — RIMEGEPANT SULFATE 75 MG/75MG
75 TABLET, ORALLY DISINTEGRATING ORAL AS NEEDED
Qty: 8 TABLET | Refills: 5 | Status: SHIPPED | OUTPATIENT
Start: 2024-11-11

## 2024-11-11 NOTE — TELEPHONE ENCOUNTER
Medication: NURTEC 75 MG      Date of last refill: 24 (#8/5R)  [place]     Last office visit: 24  Due back to clinic per last office note:  1 yr  Date next office visit scheduled:    No future appointments.        Last OV note recommendation:       Problem/s Identified this visit:   1. Chronic migraine w/o aura w/o status migrainosus, not intractable    2. Cervical myofascial pain syndrome    3. Intractable migraine without aura and with status migrainosus             Discussion plus Diagnostics & Treatment Orders:  Today we will need to intervene with Toradol 30 mg and dexamethasone 10 mg and will start Medrol Dosepak tomorrow.     Samples of Nurtec to be taken 1 a day as needed for the next 3 to 4 days.  May only use Zomig as a rescue during this days.     Renewed all the other medications.  Follow-up in 1 year.  Sooner if headaches are not better          Orders Placed This Encounter    methylPREDNISolone (MEDROL) 4 MG Oral Tablet Therapy Pack       Sig: Take as directed       Dispense:  1 each       Refill:  0    Zolmitriptan 5 MG Nasal Solution       Si spray by Nasal route as needed for Migraine.       Dispense:  12 each       Refill:  2    Erenumab-aooe (AIMOVIG) 140 MG/ML Subcutaneous Solution Auto-injector       Sig: Inject 1 mL (140 mg total) into the skin every 30 (thirty) days.       Dispense:  3 mL       Refill:  3    gabapentin 600 MG Oral Tab       Sig: Take 1 tablet (600 mg total) by mouth 3 (three) times daily.       Dispense:  270 tablet       Refill:  0

## 2025-01-29 ENCOUNTER — TELEPHONE (OUTPATIENT)
Dept: NEUROLOGY | Facility: CLINIC | Age: 60
End: 2025-01-29

## 2025-01-29 NOTE — TELEPHONE ENCOUNTER
Received request for PA for ZOMATRIPTAN via fax from [employer].  PA initiated via [employer].    Will await determination.

## 2025-02-11 NOTE — TELEPHONE ENCOUNTER
Medication: ZOLMITRIPTAN 5 MG      Date of last refill: 24 (#12/2R)  [place]     Last office visit: 24  Due back to clinic per last office note:  1 yr  Date next office visit scheduled:    No future appointments.        Last OV note recommendation:    Problem/s Identified this visit:   1. Chronic migraine w/o aura w/o status migrainosus, not intractable    2. Cervical myofascial pain syndrome    3. Intractable migraine without aura and with status migrainosus             Discussion plus Diagnostics & Treatment Orders:  Today we will need to intervene with Toradol 30 mg and dexamethasone 10 mg and will start Medrol Dosepak tomorrow.     Samples of Nurtec to be taken 1 a day as needed for the next 3 to 4 days.  May only use Zomig as a rescue during this days.     Renewed all the other medications.  Follow-up in 1 year.  Sooner if headaches are not better          Orders Placed This Encounter    methylPREDNISolone (MEDROL) 4 MG Oral Tablet Therapy Pack       Sig: Take as directed       Dispense:  1 each       Refill:  0    Zolmitriptan 5 MG Nasal Solution       Si spray by Nasal route as needed for Migraine.       Dispense:  12 each       Refill:  2    Erenumab-aooe (AIMOVIG) 140 MG/ML Subcutaneous Solution Auto-injector       Sig: Inject 1 mL (140 mg total) into the skin every 30 (thirty) days.       Dispense:  3 mL       Refill:  3    gabapentin 600 MG Oral Tab       Sig: Take 1 tablet (600 mg total) by mouth 3 (three) times daily.       Dispense:  270 tablet       Refill:  0      Refer for Acupuncture

## 2025-02-12 RX ORDER — ZOLMITRIPTAN 5 MG/1
SPRAY NASAL
Qty: 12 EACH | Refills: 1 | Status: SHIPPED | OUTPATIENT
Start: 2025-02-12

## 2025-02-27 ENCOUNTER — PATIENT MESSAGE (OUTPATIENT)
Dept: NEUROLOGY | Facility: CLINIC | Age: 60
End: 2025-02-27

## 2025-02-27 NOTE — TELEPHONE ENCOUNTER
PA requested for Zolmitriptan  Clinical questions answered and submitted to insurance  Awaiting coverage determination

## 2025-03-03 NOTE — TELEPHONE ENCOUNTER
Received fax from [employer]   Approval received for patient use of Zolmitriptan   Approval granted: 1/29/25-2/28/26        Pt notified

## 2025-05-16 NOTE — TELEPHONE ENCOUNTER
Medication: Nurtec 75 MG      Date of last refill: 2024(#8/5R)  [place]     Last office visit: 24  Due back to clinic per last office note:  1 yr  Date next office visit scheduled:    No future appointments.        Last OV note recommendation:     Problem/s Identified this visit:   1. Chronic migraine w/o aura w/o status migrainosus, not intractable    2. Cervical myofascial pain syndrome    3. Intractable migraine without aura and with status migrainosus             Discussion plus Diagnostics & Treatment Orders:  Today we will need to intervene with Toradol 30 mg and dexamethasone 10 mg and will start Medrol Dosepak tomorrow.     Samples of Nurtec to be taken 1 a day as needed for the next 3 to 4 days.  May only use Zomig as a rescue during this days.     Renewed all the other medications.  Follow-up in 1 year.  Sooner if headaches are not better             Orders Placed This Encounter    methylPREDNISolone (MEDROL) 4 MG Oral Tablet Therapy Pack       Sig: Take as directed       Dispense:  1 each       Refill:  0    Zolmitriptan 5 MG Nasal Solution       Si spray by Nasal route as needed for Migraine.       Dispense:  12 each       Refill:  2    Erenumab-aooe (AIMOVIG) 140 MG/ML Subcutaneous Solution Auto-injector       Sig: Inject 1 mL (140 mg total) into the skin every 30 (thirty) days.       Dispense:  3 mL       Refill:  3    gabapentin 600 MG Oral Tab       Sig: Take 1 tablet (600 mg total) by mouth 3 (three) times daily.       Dispense:  270 tablet       Refill:  0      Refer for Acupuncture

## 2025-05-18 RX ORDER — RIMEGEPANT SULFATE 75 MG/75MG
TABLET, ORALLY DISINTEGRATING ORAL
Qty: 8 TABLET | Refills: 0 | Status: SHIPPED | OUTPATIENT
Start: 2025-05-18

## 2025-05-21 ENCOUNTER — TELEPHONE (OUTPATIENT)
Dept: NEUROLOGY | Facility: CLINIC | Age: 60
End: 2025-05-21

## 2025-05-21 NOTE — TELEPHONE ENCOUNTER
PA requested for [facility]  Clinical questions answered and submitted to insurance  Awaiting coverage determination

## 2025-05-21 NOTE — TELEPHONE ENCOUNTER
Received fax from [employer]   Approval received for patient use of Nurtec   Approval granted: April 21, 2025 to May 21, 2026         Pt notified

## 2025-06-23 RX ORDER — RIMEGEPANT SULFATE 75 MG/75MG
TABLET, ORALLY DISINTEGRATING ORAL
Qty: 8 TABLET | Refills: 3 | Status: SHIPPED | OUTPATIENT
Start: 2025-06-23

## 2025-06-23 NOTE — TELEPHONE ENCOUNTER
Medication: Nurtec 75 MG      Date of last refill: 2025(#8/5R)  [place]     Last office visit: 24  Due back to clinic per last office note:  1 yr  Date next office visit scheduled:  10/02/2025  No future appointments.        Last OV note recommendation:     Problem/s Identified this visit:   1. Chronic migraine w/o aura w/o status migrainosus, not intractable    2. Cervical myofascial pain syndrome    3. Intractable migraine without aura and with status migrainosus             Discussion plus Diagnostics & Treatment Orders:  Today we will need to intervene with Toradol 30 mg and dexamethasone 10 mg and will start Medrol Dosepak tomorrow.     Samples of Nurtec to be taken 1 a day as needed for the next 3 to 4 days.  May only use Zomig as a rescue during this days.     Renewed all the other medications.  Follow-up in 1 year.  Sooner if headaches are not better             Orders Placed This Encounter    methylPREDNISolone (MEDROL) 4 MG Oral Tablet Therapy Pack       Sig: Take as directed       Dispense:  1 each       Refill:  0    Zolmitriptan 5 MG Nasal Solution       Si spray by Nasal route as needed for Migraine.       Dispense:  12 each       Refill:  2    Erenumab-aooe (AIMOVIG) 140 MG/ML Subcutaneous Solution Auto-injector       Sig: Inject 1 mL (140 mg total) into the skin every 30 (thirty) days.       Dispense:  3 mL       Refill:  3    gabapentin 600 MG Oral Tab       Sig: Take 1 tablet (600 mg total) by mouth 3 (three) times daily.       Dispense:  270 tablet       Refill:  0      Refer for Acupuncture

## 2025-07-01 ENCOUNTER — OFFICE VISIT (OUTPATIENT)
Dept: ORTHOPEDICS CLINIC | Facility: CLINIC | Age: 60
End: 2025-07-01
Payer: MEDICARE

## 2025-07-01 VITALS — BODY MASS INDEX: 24.91 KG/M2 | WEIGHT: 155 LBS | HEIGHT: 66 IN

## 2025-07-01 DIAGNOSIS — Q66.70 HIGH ARCHES: Primary | ICD-10-CM

## 2025-07-01 DIAGNOSIS — E11.42 DM TYPE 2 WITH DIABETIC PERIPHERAL NEUROPATHY (HCC): ICD-10-CM

## 2025-07-01 PROCEDURE — 99213 OFFICE O/P EST LOW 20 MIN: CPT | Performed by: PODIATRIST

## 2025-07-01 RX ORDER — TIRZEPATIDE 15 MG/.5ML
INJECTION, SOLUTION SUBCUTANEOUS
COMMUNITY
Start: 2025-06-29

## 2025-07-01 RX ORDER — CLOBETASOL PROPIONATE 0.5 MG/ML
SOLUTION TOPICAL
COMMUNITY
Start: 2025-04-21

## 2025-07-01 RX ORDER — LEFLUNOMIDE 10 MG/1
10 TABLET ORAL DAILY
COMMUNITY
Start: 2025-06-23

## 2025-07-01 NOTE — PROGRESS NOTES
[facility] Progress Note    Subjective:     Francheska is here for follow-up visit today.  59-year-old female.  I have seen for years in the past in our old office for diabetic footcare  She had orthotics made about 3 years ago through [facility]  She also get some inserts and shoes at the new [employer] store    Objective:     Diminished sensation, sharp versus dull sensation  .  Palpable pedal pulses no open lesions feet warm.  High arches bilateral  Mild tailor's bunions      Imaging: no        Assessment/Plan:     Diagnoses and all orders for this visit:    High arches  -     DME - EXTERNAL     DM type 2 with diabetic peripheral neuropathy (HCC)  -     DME - EXTERNAL         Diabetic foot exam done today for yearly check    Rx new orthotics  Preferably Rinella, gave [employer] info too    Yearly visit recommended            Elida Pinto DPM  [facility]    [employer] speech recognition software was used to prepare this note. If a word or phrase is confusing, it is likely do to a failure of recognition. Please contact me with any questions or clarifications.

## 2025-08-05 ENCOUNTER — TELEPHONE (OUTPATIENT)
Dept: NEUROLOGY | Facility: CLINIC | Age: 60
End: 2025-08-05

## 2025-08-25 RX ORDER — ZOLMITRIPTAN 5 MG/1
SPRAY NASAL
Qty: 6 EACH | Refills: 1 | Status: SHIPPED | OUTPATIENT
Start: 2025-08-25

## 2025-08-27 DIAGNOSIS — G43.709 CHRONIC MIGRAINE W/O AURA W/O STATUS MIGRAINOSUS, NOT INTRACTABLE: ICD-10-CM

## 2025-08-28 RX ORDER — ERENUMAB-AOOE 140 MG/ML
140 INJECTION, SOLUTION SUBCUTANEOUS
Qty: 1 EACH | Refills: 1 | Status: SHIPPED | OUTPATIENT
Start: 2025-08-28

## (undated) DIAGNOSIS — M79.18 CERVICAL MYOFASCIAL PAIN SYNDROME: ICD-10-CM

## (undated) DIAGNOSIS — G43.709 CHRONIC MIGRAINE W/O AURA W/O STATUS MIGRAINOSUS, NOT INTRACTABLE: Primary | ICD-10-CM

## (undated) DEVICE — APPLICATOR COTTON TIP 6\" 2/PK

## (undated) DEVICE — STERILE POLYISOPRENE POWDER-FREE SURGICAL GLOVES: Brand: PROTEXIS

## (undated) DEVICE — MEDI-VAC NON-CONDUCTIVE SUCTION TUBING: Brand: CARDINAL HEALTH

## (undated) DEVICE — EXOFIN TISSUE ADHESIVE 1.0ML

## (undated) DEVICE — TUBING CYSTO

## (undated) DEVICE — 3M™ STERI-DRAPE™ INSTRUMENT POUCH 1018: Brand: STERI-DRAPE™

## (undated) DEVICE — GYN CDS: Brand: MEDLINE INDUSTRIES, INC.

## (undated) DEVICE — TOWEL OR BLU 16X26 STRL

## (undated) DEVICE — SYRINGE 10ML LL CONTRL SYRINGE

## (undated) DEVICE — SUTURE VICRYL 2-0 CT-2

## (undated) DEVICE — SUTURE VICRYL 4-0 RB-1

## (undated) DEVICE — SUTURE ETHILON 3-0 PS-2

## (undated) DEVICE — GOWN SURG AERO BLUE PERF LG

## (undated) DEVICE — 3M™ IOBAN™ 2 ANTIMICROBIAL INCISE DRAPE 6650EZ: Brand: IOBAN™ 2

## (undated) DEVICE — USE ITEM #176901

## (undated) DEVICE — FLEXIBLE YANKAUER,MEDIUM TIP, NO VACUUM CONTROL: Brand: ARGYLE

## (undated) DEVICE — 9534HP TRANSPARENT DRSG W/FRAME: Brand: 3M™ TEGADERM™

## (undated) DEVICE — VIOLET BRAIDED (POLYGLACTIN 910), SYNTHETIC ABSORBABLE SUTURE: Brand: COATED VICRYL

## (undated) DEVICE — ABDOMINAL BINDER: Brand: DEROYAL

## (undated) DEVICE — SLEEVE KENDALL SCD EXPRESS MED

## (undated) DEVICE — SUT VICRYL 3-0 X-1 J458H

## (undated) DEVICE — PREMIUM WET SKIN PREP TRAY: Brand: MEDLINE INDUSTRIES, INC.

## (undated) DEVICE — SPONGE LAP 18X18 XRAY STRL

## (undated) DEVICE — SOL H2O IRRIGATION 3000ML

## (undated) DEVICE — BANDAGE ROLL,100% COTTON, 6 PLY, LARGE: Brand: KERLIX

## (undated) DEVICE — PROXIMATE SKIN STAPLERS (35 WIDE) CONTAINS 35 STAINLESS STEEL STAPLES (FIXED HEAD): Brand: PROXIMATE

## (undated) DEVICE — SOL  .9 1000ML BTL

## (undated) DEVICE — SUTURE VICRYL 3-0 SH

## (undated) DEVICE — STANDARD HYPODERMIC NEEDLE,POLYPROPYLENE HUB: Brand: MONOJECT

## (undated) DEVICE — SUTURE VICRYL 3-0 X-1

## (undated) DEVICE — BLAKE SILICONE DRAIN, 15 FR ROUND, HUBLESS WITH 3/16" TROCAR: Brand: BLAKE

## (undated) DEVICE — SCD SLEEVE KNEE HI BLEND

## (undated) DEVICE — DERMABOND LIQUID ADHESIVE

## (undated) DEVICE — REM POLYHESIVE ADULT PATIENT RETURN ELECTRODE: Brand: VALLEYLAB

## (undated) DEVICE — SUTURE PROLENE 5-0 8325H

## (undated) DEVICE — SUTURE MONOCRYL 4-0 PS-2

## (undated) DEVICE — SUTURE VICRYL 0 CT-1

## (undated) DEVICE — SUTURE VICRYL 2-0 CT-1

## (undated) DEVICE — SUT VICRYL 2-0 CT-1 J945H

## (undated) DEVICE — BAG DRAIN INFECTION CNTRL 2000

## (undated) DEVICE — LIGACLIP MCA MULTIPLE CLIP APPLIERS, 20 SMALL CLIPS: Brand: LIGACLIP

## (undated) DEVICE — 1010 S-DRAPE TOWEL DRAPE 10/BX: Brand: STERI-DRAPE™

## (undated) DEVICE — GAMMEX® PI HYBRID SIZE 6.5, STERILE POWDER-FREE SURGICAL GLOVE, POLYISOPRENE AND NEOPRENE BLEND: Brand: GAMMEX

## (undated) DEVICE — RETRACTOR LONE STAR STAYS LG

## (undated) DEVICE — MAJOR GENERAL: Brand: MEDLINE INDUSTRIES, INC.

## (undated) DEVICE — SOL H2O IV

## (undated) DEVICE — PEN: MARKING STD PT 100/CS: Brand: MEDICAL ACTION INDUSTRIES

## (undated) DEVICE — 3M™ STERI-STRIP™ REINFORCED ADHESIVE SKIN CLOSURES, R1547, 1/2 IN X 4 IN (12 MM X 100 MM), 6 STRIPS/ENVELOPE: Brand: 3M™ STERI-STRIP™

## (undated) DEVICE — CRADLE PSTN TLC ARM FM ADJ

## (undated) DEVICE — COVER,MAYO STAND,STERILE: Brand: MEDLINE

## (undated) DEVICE — Device

## (undated) DEVICE — LIGACLIP MCA MULTIPLE CLIP APPLIERS, 20 MEDIUM CLIPS: Brand: LIGACLIP

## (undated) DEVICE — CAUTERY PENCIL

## (undated) DEVICE — DRAPE SHEET LG

## (undated) DEVICE — INTENDED FOR TISSUE SEPARATION, AND OTHER PROCEDURES THAT REQUIRE A SHARP SURGICAL BLADE TO PUNCTURE OR CUT.: Brand: BARD-PARKER ® STAINLESS STEEL BLADES

## (undated) DEVICE — CHLORAPREP 26ML APPLICATOR

## (undated) DEVICE — ELECTRODE EDGE PENCIL 10FT

## (undated) DEVICE — SUTURE ETHIBOND 0 CT-1

## (undated) DEVICE — #15 STERILE STAINLESS BLADE: Brand: STERILE STAINLESS BLADES

## (undated) DEVICE — TRAY SURESTEP 16 BARDEX UMETR

## (undated) DEVICE — CONTAINER SPEC STR 4OZ GRY LID

## (undated) DEVICE — SOL NACL IRRIG 0.9% 1000ML BTL

## (undated) NOTE — LETTER
OUTSIDE TESTING RESULT REQUEST     IMPORTANT: FOR YOUR IMMEDIATE ATTENTION  Please FAX all test results listed below to: [phone]     Testing already done on or about:      * * * * If testing is NOT complete, arrange with patient A.S.A.P. * * * *

## (undated) NOTE — LETTER
[facility]  [address] [place], [place] [address]mary Fernando [address]  [facility]: [phone]  FAX: Emperatriz Hicks [address]: [phone]

## (undated) NOTE — LETTER
OUTSIDE TESTING RESULT REQUEST     IMPORTANT: FOR YOUR IMMEDIATE ATTENTION  Please FAX all test results listed below to: [phone]     Testing already done on or about: 2023    * * * * If testing is NOT complete, arrange with patient A.S.A.P. * * * *      Patient Name:  Batter  Surgery Date: 3/6/2023  CSN: [id number]  Medical Record: [id number]   : 1965 - A: 62 y      Sex: female  Surgeon(s):  MD Bipin Wallace DO  Procedure: EXCISION OF VAGINAL APEX SCAR, PERINEOPLASTY, CYSTOSCOPY  Anesthesia Type: General     Surgeon: Jaylin Batres MD     The following Testing and Time Line are REQUIRED PER ANESTHESIA     EKG READ AND SIGNED WITHIN   90 days  BMP (requires 4 hour fast) within  90 days      Thank You,   Sent by:MÓNICA Gonzales - Pre-Admission Testing

## (undated) NOTE — LETTER
19    Katie Whyte   [address]    Patient:  Elmer Person  : 1965  Member ID:  [id number]  Group number:  [id number]    Dear Vivian Stark Representative:     This letter serves as a forma appeal for reconsideration of

## (undated) NOTE — LETTER
18      Regarding: Omar Pride, LUZMARIA 1965  In response to Case # [facility], denial for Aimovig 70 mg      To Whom it May Concern:    MsScott Eliezer Juan is following with my office for treatment of chronic migraine headaches.  I feel she would highly bene